# Patient Record
Sex: FEMALE | Race: WHITE | NOT HISPANIC OR LATINO | Employment: FULL TIME | URBAN - METROPOLITAN AREA
[De-identification: names, ages, dates, MRNs, and addresses within clinical notes are randomized per-mention and may not be internally consistent; named-entity substitution may affect disease eponyms.]

---

## 2023-08-30 ENCOUNTER — OFFICE VISIT (OUTPATIENT)
Dept: PRIMARY CARE | Facility: CLINIC | Age: 43
End: 2023-08-30
Payer: COMMERCIAL

## 2023-08-30 VITALS
HEART RATE: 81 BPM | WEIGHT: 194 LBS | BODY MASS INDEX: 29.4 KG/M2 | OXYGEN SATURATION: 98 % | DIASTOLIC BLOOD PRESSURE: 64 MMHG | HEIGHT: 68 IN | SYSTOLIC BLOOD PRESSURE: 126 MMHG | TEMPERATURE: 97.8 F

## 2023-08-30 DIAGNOSIS — R53.83 OTHER FATIGUE: ICD-10-CM

## 2023-08-30 DIAGNOSIS — G43.809 OTHER MIGRAINE WITHOUT STATUS MIGRAINOSUS, NOT INTRACTABLE: Primary | ICD-10-CM

## 2023-08-30 DIAGNOSIS — Z12.31 BREAST CANCER SCREENING BY MAMMOGRAM: ICD-10-CM

## 2023-08-30 DIAGNOSIS — E55.9 VITAMIN D DEFICIENCY: ICD-10-CM

## 2023-08-30 PROBLEM — G43.909 MIGRAINE WITHOUT STATUS MIGRAINOSUS, NOT INTRACTABLE: Status: ACTIVE | Noted: 2023-08-30

## 2023-08-30 PROBLEM — E53.8 VITAMIN B12 DEFICIENCY: Status: ACTIVE | Noted: 2021-07-12

## 2023-08-30 PROCEDURE — 99204 OFFICE O/P NEW MOD 45 MIN: CPT | Performed by: FAMILY MEDICINE

## 2023-08-30 RX ORDER — CYCLOBENZAPRINE HCL 10 MG
TABLET ORAL
COMMUNITY
Start: 2023-08-24 | End: 2024-03-12 | Stop reason: ALTCHOICE

## 2023-08-30 RX ORDER — DULOXETIN HYDROCHLORIDE 30 MG/1
30 CAPSULE, DELAYED RELEASE ORAL DAILY
COMMUNITY
Start: 2022-10-04 | End: 2023-12-14

## 2023-08-30 RX ORDER — FREMANEZUMAB-VFRM 225 MG/1.5ML
225 INJECTION SUBCUTANEOUS
Qty: 3 EACH | Refills: 3 | Status: SHIPPED | OUTPATIENT
Start: 2023-08-30 | End: 2023-09-29 | Stop reason: SDUPTHER

## 2023-08-30 RX ORDER — UBROGEPANT 50 MG/1
50 TABLET ORAL DAILY PRN
Qty: 12 TABLET | Refills: 3 | Status: SHIPPED | OUTPATIENT
Start: 2023-08-30 | End: 2024-03-12 | Stop reason: ALTCHOICE

## 2023-08-30 RX ORDER — BUPROPION HYDROCHLORIDE 150 MG/1
150 TABLET ORAL DAILY
COMMUNITY
Start: 2023-08-24 | End: 2023-09-29 | Stop reason: SDUPTHER

## 2023-08-30 RX ORDER — LANOLIN ALCOHOL/MO/W.PET/CERES
1-2 CREAM (GRAM) TOPICAL DAILY
COMMUNITY
Start: 2023-07-25 | End: 2023-12-14

## 2023-08-30 RX ORDER — PROPRANOLOL HYDROCHLORIDE 80 MG/1
80 CAPSULE, EXTENDED RELEASE ORAL DAILY
COMMUNITY
Start: 2023-08-24 | End: 2023-09-29 | Stop reason: SDUPTHER

## 2023-08-30 RX ORDER — OMEPRAZOLE 40 MG/1
40 CAPSULE, DELAYED RELEASE ORAL DAILY
COMMUNITY
Start: 2023-08-24 | End: 2023-09-29 | Stop reason: SDUPTHER

## 2023-08-30 SDOH — ECONOMIC STABILITY: FOOD INSECURITY: WITHIN THE PAST 12 MONTHS, YOU WORRIED THAT YOUR FOOD WOULD RUN OUT BEFORE YOU GOT MONEY TO BUY MORE.: NEVER TRUE

## 2023-08-30 SDOH — ECONOMIC STABILITY: TRANSPORTATION INSECURITY
IN THE PAST 12 MONTHS, HAS LACK OF TRANSPORTATION KEPT YOU FROM MEETINGS, WORK, OR FROM GETTING THINGS NEEDED FOR DAILY LIVING?: NO

## 2023-08-30 SDOH — ECONOMIC STABILITY: HOUSING INSECURITY
IN THE LAST 12 MONTHS, WAS THERE A TIME WHEN YOU DID NOT HAVE A STEADY PLACE TO SLEEP OR SLEPT IN A SHELTER (INCLUDING NOW)?: NO

## 2023-08-30 SDOH — ECONOMIC STABILITY: INCOME INSECURITY: IN THE LAST 12 MONTHS, WAS THERE A TIME WHEN YOU WERE NOT ABLE TO PAY THE MORTGAGE OR RENT ON TIME?: NO

## 2023-08-30 SDOH — HEALTH STABILITY: PHYSICAL HEALTH: ON AVERAGE, HOW MANY DAYS PER WEEK DO YOU ENGAGE IN MODERATE TO STRENUOUS EXERCISE (LIKE A BRISK WALK)?: 0 DAYS

## 2023-08-30 SDOH — ECONOMIC STABILITY: TRANSPORTATION INSECURITY
IN THE PAST 12 MONTHS, HAS THE LACK OF TRANSPORTATION KEPT YOU FROM MEDICAL APPOINTMENTS OR FROM GETTING MEDICATIONS?: NO

## 2023-08-30 SDOH — ECONOMIC STABILITY: FOOD INSECURITY: WITHIN THE PAST 12 MONTHS, THE FOOD YOU BOUGHT JUST DIDN'T LAST AND YOU DIDN'T HAVE MONEY TO GET MORE.: NEVER TRUE

## 2023-08-30 SDOH — HEALTH STABILITY: PHYSICAL HEALTH: ON AVERAGE, HOW MANY MINUTES DO YOU ENGAGE IN EXERCISE AT THIS LEVEL?: 0 MIN

## 2023-08-30 ASSESSMENT — SOCIAL DETERMINANTS OF HEALTH (SDOH)
HOW OFTEN DO YOU ATTEND CHURCH OR RELIGIOUS SERVICES?: NEVER
IN THE PAST 12 MONTHS, HAS THE ELECTRIC, GAS, OIL, OR WATER COMPANY THREATENED TO SHUT OFF SERVICE IN YOUR HOME?: NO
HOW HARD IS IT FOR YOU TO PAY FOR THE VERY BASICS LIKE FOOD, HOUSING, MEDICAL CARE, AND HEATING?: NOT HARD AT ALL
WITHIN THE LAST YEAR, HAVE YOU BEEN HUMILIATED OR EMOTIONALLY ABUSED IN OTHER WAYS BY YOUR PARTNER OR EX-PARTNER?: NO
WITHIN THE LAST YEAR, HAVE YOU BEEN KICKED, HIT, SLAPPED, OR OTHERWISE PHYSICALLY HURT BY YOUR PARTNER OR EX-PARTNER?: NO
IN A TYPICAL WEEK, HOW MANY TIMES DO YOU TALK ON THE PHONE WITH FAMILY, FRIENDS, OR NEIGHBORS?: TWICE A WEEK
HOW OFTEN DO YOU ATTENT MEETINGS OF THE CLUB OR ORGANIZATION YOU BELONG TO?: NEVER
DO YOU BELONG TO ANY CLUBS OR ORGANIZATIONS SUCH AS CHURCH GROUPS UNIONS, FRATERNAL OR ATHLETIC GROUPS, OR SCHOOL GROUPS?: NO
WITHIN THE LAST YEAR, HAVE TO BEEN RAPED OR FORCED TO HAVE ANY KIND OF SEXUAL ACTIVITY BY YOUR PARTNER OR EX-PARTNER?: NO
WITHIN THE LAST YEAR, HAVE YOU BEEN AFRAID OF YOUR PARTNER OR EX-PARTNER?: NO
HOW OFTEN DO YOU GET TOGETHER WITH FRIENDS OR RELATIVES?: TWICE A WEEK

## 2023-08-30 ASSESSMENT — LIFESTYLE VARIABLES
AUDIT-C TOTAL SCORE: 0
HOW OFTEN DO YOU HAVE A DRINK CONTAINING ALCOHOL: NEVER
HOW OFTEN DO YOU HAVE SIX OR MORE DRINKS ON ONE OCCASION: NEVER
HOW MANY STANDARD DRINKS CONTAINING ALCOHOL DO YOU HAVE ON A TYPICAL DAY: PATIENT DOES NOT DRINK
SKIP TO QUESTIONS 9-10: 1

## 2023-08-30 ASSESSMENT — ENCOUNTER SYMPTOMS
FATIGUE: 1
HEADACHES: 1
NUMBNESS: 0
WHEEZING: 0
COUGH: 0
FEVER: 0
MYALGIAS: 1

## 2023-08-30 ASSESSMENT — PATIENT HEALTH QUESTIONNAIRE - PHQ9
2. FEELING DOWN, DEPRESSED OR HOPELESS: NOT AT ALL
1. LITTLE INTEREST OR PLEASURE IN DOING THINGS: NOT AT ALL
SUM OF ALL RESPONSES TO PHQ9 QUESTIONS 1 & 2: 0

## 2023-08-30 NOTE — PROGRESS NOTES
Subjective   Patient ID: Louisa Henley is a 43 y.o. female who presents for Annual Exam and Headache.    Headache   The pain is located in the Temporal, occipital and frontal region. The pain is moderate. Pertinent negatives include no coughing, fever or numbness. The symptoms are aggravated by activity, weather changes and fatigue. She has tried acetaminophen, NSAIDs and Excedrin for the symptoms. The treatment provided no relief.      New patient.  Here today to establish care and discuss migraines  She previously worked at [employer] but now works for .  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  Currently taking propranolol 80 mg daily and also daily magnesium  She states that she always has a constant headache which is always present.  This typically involves both temples, occurs daily and is typically a 3-4 out of 10 intensity throbbing pain  She will get a worsening migraine headache approximately 6-7 times per month.  When this occurs she will get a sharp shooting pain in both temples and this can be severe.  Accompanied by photophobia and phonophobia.  She will occasionally get visual aura.  No nausea  She previously took Elavil which was not effective.  Topamax made her feel weird.  She was previously on Imitrex without improvement.  Migraines have been worse over the last several years.  She is never seen a neurologist.  She has not had any recent neuroimaging  She has had at least 3 occasions where she has needed a Toradol injection for her migraines      She has a history of chronic back pain due to scoliosis and takes Cymbalta for this which helps  She takes Wellbutrin for depression which has been helping  She currently takes daily omeprazole for GERD.  Reports having an EGD and colonoscopy last year  Over the last 6 to 8 months she has had muscle pain involving her entire  "body.  No paresthesias  She has had fatigue present for at least 1 year.  She feels tired and sleepy during the day but denies any snoring or any witnessed apneas or gasping  Has history of vitamin D deficiency.  Currently takes vitamin D OTC 1000 IU daily          Review of Systems   Constitutional:  Positive for fatigue. Negative for fever.   Respiratory:  Negative for cough and wheezing.    Cardiovascular:  Negative for chest pain.   Musculoskeletal:  Positive for myalgias.   Neurological:  Positive for headaches. Negative for numbness.       Objective   /64   Pulse 81   Temp 36.6 °C (97.8 °F) (Temporal)   Ht 1.715 m (5' 7.5\")   Wt 88 kg (194 lb)   LMP 08/02/2023 (Exact Date)   SpO2 98%   Breastfeeding No   BMI 29.94 kg/m²     Physical Exam  Vitals reviewed.   Constitutional:       General: She is not in acute distress.     Appearance: Normal appearance.   HENT:      Head: Normocephalic.      Right Ear: Tympanic membrane, ear canal and external ear normal.      Left Ear: Tympanic membrane, ear canal and external ear normal.      Mouth/Throat:      Mouth: Mucous membranes are moist.      Pharynx: No oropharyngeal exudate or posterior oropharyngeal erythema.   Eyes:      Extraocular Movements: Extraocular movements intact.      Conjunctiva/sclera: Conjunctivae normal.      Pupils: Pupils are equal, round, and reactive to light.   Cardiovascular:      Rate and Rhythm: Normal rate and regular rhythm.      Heart sounds: Normal heart sounds.   Pulmonary:      Effort: Pulmonary effort is normal.      Breath sounds: Normal breath sounds.   Lymphadenopathy:      Cervical: No cervical adenopathy.   Skin:     Findings: No rash.   Neurological:      General: No focal deficit present.      Mental Status: She is alert and oriented to person, place, and time.      Cranial Nerves: No cranial nerve deficit (Grossly normal).      Sensory: No sensory deficit.      Deep Tendon Reflexes: Reflexes normal.   Psychiatric:  "        Mood and Affect: Mood normal.         Behavior: Behavior normal.       Assessment/Plan   Problem List Items Addressed This Visit    None  Visit Diagnoses       Other migraine without status migrainosus, not intractable    -  Primary    Relevant Medications    fremanezumab (Ajovy Autoinjector) 225 mg/1.5 mL auto-injector    ubrogepant (Ubrelvy) 50 mg tablet    Other Relevant Orders    Referral to Neurology    Breast cancer screening by mammogram        Relevant Orders    BI mammo bilateral screening tomosynthesis    Other fatigue        Relevant Orders    CBC    Comprehensive Metabolic Panel    TSH with reflex to Free T4 if abnormal    Lipid Panel    Vitamin B12    Magnesium    Vitamin D deficiency        Relevant Orders    Vitamin D 25-Hydroxy,Total (for eval of Vitamin D levels)          Migraines: This has been poorly controlled with daily, constant headaches, as well as more severe migraine headaches which occur 6-7 times per month.  She previously failed treatment with medications including Imitrex, Topamax and Elavil, and these headaches have continued to recur despite treatment with propranolol and magnesium.  We we will continue propranolol and start Ajovy for migraine prophylaxis and Ubrelvy as needed for more severe migraine headaches.  Given referral to neurology and follow-up in 2 to 3 months for recheck  Due to her fatigue and muscle pain we will check labs including CBC, TSH and also vitamin D level.  Since she does take a PPI on a regular basis we will also check vitamin B12 levels and magnesium.  Discuss further at follow-up in 2 to 3 months

## 2023-08-30 NOTE — PROGRESS NOTES
Subjective   Patient ID: Louisa Henley is a 43 y.o. female who presents for Annual Exam and Headache.    Headache   The pain is located in the Temporal, occipital and frontal region. The pain is moderate. Pertinent negatives include no coughing, fever or numbness. The symptoms are aggravated by activity, weather changes and fatigue. She has tried acetaminophen, NSAIDs and Excedrin for the symptoms. The treatment provided no relief.      New patient.  Here today to establish care and discuss migraines  She previously worked at [employer] but now works for .  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  Currently taking propranolol 80 mg daily and also daily magnesium  She states that she always has a constant headache which is always present.  This typically involves both temples, occurs daily and is typically a 3-4 out of 10 intensity throbbing pain  She will get a worsening migraine headache approximately 6-7 times per month.  When this occurs she will get a sharp shooting pain in both temples and this can be severe.  Accompanied by photophobia and phonophobia.  She will occasionally get visual aura.  No nausea  She previously took Elavil which was not effective.  Topamax made her feel weird.  She was previously on Imitrex without improvement.  Migraines have been worse over the last several years.  She is never seen a neurologist.  She has not had any recent neuroimaging  She has had at least 3 occasions where she has needed a Toradol injection for her migraines      She has a history of chronic back pain due to scoliosis and takes Cymbalta for this which helps  She takes Wellbutrin for depression which has been helping  She currently takes daily omeprazole for GERD.  Reports having an EGD and colonoscopy last year  Over the last 6 to 8 months she has had muscle pain involving her entire  "body.  No paresthesias  She has had fatigue present for at least 1 year.  She feels tired and sleepy during the day but denies any snoring or any witnessed apneas or gasping  Has history of vitamin D deficiency.  Currently takes vitamin D OTC 1000 IU daily          Review of Systems   Constitutional:  Positive for fatigue. Negative for fever.   Respiratory:  Negative for cough and wheezing.    Cardiovascular:  Negative for chest pain.   Musculoskeletal:  Positive for myalgias.   Neurological:  Positive for headaches. Negative for numbness.       Objective   /64   Pulse 81   Temp 36.6 °C (97.8 °F) (Temporal)   Ht 1.715 m (5' 7.5\")   Wt 88 kg (194 lb)   LMP 08/02/2023 (Exact Date)   SpO2 98%   Breastfeeding No   BMI 29.94 kg/m²     Physical Exam  Vitals reviewed.   Constitutional:       General: She is not in acute distress.     Appearance: Normal appearance.   HENT:      Head: Normocephalic.      Right Ear: Tympanic membrane, ear canal and external ear normal.      Left Ear: Tympanic membrane, ear canal and external ear normal.      Mouth/Throat:      Mouth: Mucous membranes are moist.      Pharynx: No oropharyngeal exudate or posterior oropharyngeal erythema.   Eyes:      Extraocular Movements: Extraocular movements intact.      Conjunctiva/sclera: Conjunctivae normal.      Pupils: Pupils are equal, round, and reactive to light.   Cardiovascular:      Rate and Rhythm: Normal rate and regular rhythm.      Heart sounds: Normal heart sounds.   Pulmonary:      Effort: Pulmonary effort is normal.      Breath sounds: Normal breath sounds.   Lymphadenopathy:      Cervical: No cervical adenopathy.   Skin:     Findings: No rash.   Neurological:      General: No focal deficit present.      Mental Status: She is alert and oriented to person, place, and time.      Cranial Nerves: No cranial nerve deficit (Grossly normal).      Sensory: No sensory deficit.      Deep Tendon Reflexes: Reflexes normal.   Psychiatric:  "        Mood and Affect: Mood normal.         Behavior: Behavior normal.         Assessment/Plan   Problem List Items Addressed This Visit    None  Visit Diagnoses       Breast cancer screening by mammogram    -  Primary    Relevant Orders    BI mammo bilateral screening tomosynthesis    Other migraine without status migrainosus, not intractable        Relevant Medications    fremanezumab (Ajovy Autoinjector) 225 mg/1.5 mL auto-injector    ubrogepant (Ubrelvy) 50 mg tablet    Other Relevant Orders    Referral to Neurology    Other fatigue        Relevant Orders    CBC    Comprehensive Metabolic Panel    TSH with reflex to Free T4 if abnormal    Lipid Panel    Vitamin B12    Magnesium    Vitamin D deficiency        Relevant Orders    Vitamin D 25-Hydroxy,Total (for eval of Vitamin D levels)

## 2023-09-05 DIAGNOSIS — Z30.9 ENCOUNTER FOR CONTRACEPTIVE MANAGEMENT, UNSPECIFIED TYPE: ICD-10-CM

## 2023-09-05 RX ORDER — ETONOGESTREL AND ETHINYL ESTRADIOL VAGINAL RING .015; .12 MG/D; MG/D
1 RING VAGINAL
COMMUNITY
Start: 2022-10-18 | End: 2023-09-05 | Stop reason: SDUPTHER

## 2023-09-05 RX ORDER — ETONOGESTREL AND ETHINYL ESTRADIOL VAGINAL RING .015; .12 MG/D; MG/D
1 RING VAGINAL
Qty: 1 EACH | Refills: 12 | Status: SHIPPED | OUTPATIENT
Start: 2023-09-05 | End: 2023-09-06 | Stop reason: SDUPTHER

## 2023-09-06 RX ORDER — ETONOGESTREL AND ETHINYL ESTRADIOL VAGINAL RING .015; .12 MG/D; MG/D
1 RING VAGINAL
Qty: 1 EACH | Refills: 12 | Status: SHIPPED | OUTPATIENT
Start: 2023-09-06 | End: 2023-09-29 | Stop reason: SDUPTHER

## 2023-09-06 NOTE — TELEPHONE ENCOUNTER
Rx Refill Request Telephone Encounter    Name:  Louisa Henley  :  [id number]  Medication Name:  Nuvaring

## 2023-09-29 DIAGNOSIS — F32.A DEPRESSION, UNSPECIFIED DEPRESSION TYPE: ICD-10-CM

## 2023-09-29 DIAGNOSIS — K21.9 GASTROESOPHAGEAL REFLUX DISEASE, UNSPECIFIED WHETHER ESOPHAGITIS PRESENT: ICD-10-CM

## 2023-09-29 DIAGNOSIS — R03.0 ELEVATED BLOOD PRESSURE READING WITHOUT DIAGNOSIS OF HYPERTENSION: ICD-10-CM

## 2023-09-29 DIAGNOSIS — Z30.9 ENCOUNTER FOR CONTRACEPTIVE MANAGEMENT, UNSPECIFIED TYPE: ICD-10-CM

## 2023-09-29 DIAGNOSIS — G43.809 OTHER MIGRAINE WITHOUT STATUS MIGRAINOSUS, NOT INTRACTABLE: ICD-10-CM

## 2023-09-29 PROBLEM — R51.9 CHRONIC DAILY HEADACHE: Status: ACTIVE | Noted: 2023-01-31

## 2023-09-29 PROBLEM — N32.81 OAB (OVERACTIVE BLADDER): Status: ACTIVE | Noted: 2017-01-24

## 2023-09-29 RX ORDER — FREMANEZUMAB-VFRM 225 MG/1.5ML
225 INJECTION SUBCUTANEOUS
Qty: 3 EACH | Refills: 3 | Status: SHIPPED | OUTPATIENT
Start: 2023-09-29 | End: 2024-04-09

## 2023-09-29 RX ORDER — BUPROPION HYDROCHLORIDE 150 MG/1
150 TABLET ORAL DAILY
Qty: 90 TABLET | Refills: 3 | Status: SHIPPED | OUTPATIENT
Start: 2023-09-29 | End: 2024-03-12 | Stop reason: ALTCHOICE

## 2023-09-29 RX ORDER — OMEPRAZOLE 40 MG/1
40 CAPSULE, DELAYED RELEASE ORAL DAILY
Qty: 90 CAPSULE | Refills: 3 | Status: SHIPPED | OUTPATIENT
Start: 2023-09-29

## 2023-09-29 RX ORDER — PROPRANOLOL HYDROCHLORIDE 80 MG/1
80 CAPSULE, EXTENDED RELEASE ORAL DAILY
Qty: 90 CAPSULE | Refills: 3 | Status: SHIPPED | OUTPATIENT
Start: 2023-09-29 | End: 2023-12-14

## 2023-09-29 RX ORDER — ETONOGESTREL AND ETHINYL ESTRADIOL VAGINAL RING .015; .12 MG/D; MG/D
1 RING VAGINAL
Qty: 3 EACH | Refills: 3 | Status: SHIPPED | OUTPATIENT
Start: 2023-09-29 | End: 2024-04-09 | Stop reason: SDUPTHER

## 2023-09-29 NOTE — TELEPHONE ENCOUNTER
Rx Refill Request Telephone Encounter    Name:  Louisa Henley  :  [id number]  Medication Name:    etonogestreL-ethinyl estradioL (Nuvaring) 0.12-0.015 mg/24 hr vaginal ring   fremanezumab (Ajovy Autoinjector) 225 mg/1.5 mL auto-injector   propranolol LA (Inderal LA) 80 mg 24 hr capsule   omeprazole (PriLOSEC) 40 mg DR capsule   buPROPion XL (Wellbutrin XL) 150 mg 24 hr tablet     [facility] [address]  Date of last appointment:  na  Date of next appointment:  na  Best number to reach patient:  [phone]      **Most of these meds were sent to a pharmacy that closed and need to resent. Thank you

## 2023-10-06 ENCOUNTER — TELEPHONE (OUTPATIENT)
Dept: PRIMARY CARE | Facility: CLINIC | Age: 43
End: 2023-10-06

## 2023-10-06 ENCOUNTER — LAB (OUTPATIENT)
Dept: LAB | Facility: LAB | Age: 43
End: 2023-10-06
Payer: COMMERCIAL

## 2023-10-06 ENCOUNTER — TELEMEDICINE (OUTPATIENT)
Dept: PRIMARY CARE | Facility: CLINIC | Age: 43
End: 2023-10-06
Payer: COMMERCIAL

## 2023-10-06 DIAGNOSIS — N39.0 ACUTE UTI: ICD-10-CM

## 2023-10-06 DIAGNOSIS — R53.83 OTHER FATIGUE: ICD-10-CM

## 2023-10-06 DIAGNOSIS — E55.9 VITAMIN D DEFICIENCY: ICD-10-CM

## 2023-10-06 DIAGNOSIS — N39.0 ACUTE UTI: Primary | ICD-10-CM

## 2023-10-06 LAB
25(OH)D3 SERPL-MCNC: 14 NG/ML (ref 30–100)
ALBUMIN SERPL BCP-MCNC: 4.1 G/DL (ref 3.4–5)
ALP SERPL-CCNC: 64 U/L (ref 33–110)
ALT SERPL W P-5'-P-CCNC: 12 U/L (ref 7–45)
ANION GAP SERPL CALC-SCNC: 13 MMOL/L (ref 10–20)
AST SERPL W P-5'-P-CCNC: 14 U/L (ref 9–39)
BILIRUB SERPL-MCNC: 0.3 MG/DL (ref 0–1.2)
BUN SERPL-MCNC: 15 MG/DL (ref 6–23)
CALCIUM SERPL-MCNC: 9.1 MG/DL (ref 8.6–10.3)
CHLORIDE SERPL-SCNC: 107 MMOL/L (ref 98–107)
CHOLEST SERPL-MCNC: 224 MG/DL (ref 0–199)
CHOLESTEROL/HDL RATIO: 5.1
CO2 SERPL-SCNC: 21 MMOL/L (ref 21–32)
CREAT SERPL-MCNC: 1.04 MG/DL (ref 0.5–1.05)
ERYTHROCYTE [DISTWIDTH] IN BLOOD BY AUTOMATED COUNT: 14.6 % (ref 11.5–14.5)
GFR SERPL CREATININE-BSD FRML MDRD: 69 ML/MIN/1.73M*2
GLUCOSE SERPL-MCNC: 115 MG/DL (ref 74–99)
HCT VFR BLD AUTO: 38.7 % (ref 36–46)
HDLC SERPL-MCNC: 44.3 MG/DL
HGB BLD-MCNC: 12.6 G/DL (ref 12–16)
LDLC SERPL CALC-MCNC: 140 MG/DL (ref 140–190)
MAGNESIUM SERPL-MCNC: 2.05 MG/DL (ref 1.6–2.4)
MCH RBC QN AUTO: 28.6 PG (ref 26–34)
MCHC RBC AUTO-ENTMCNC: 32.6 G/DL (ref 32–36)
MCV RBC AUTO: 88 FL (ref 80–100)
NON HDL CHOLESTEROL: 180 MG/DL (ref 0–149)
NRBC BLD-RTO: 0 /100 WBCS (ref 0–0)
PLATELET # BLD AUTO: 421 X10*3/UL (ref 150–450)
PMV BLD AUTO: 9.8 FL (ref 7.5–11.5)
POTASSIUM SERPL-SCNC: 4.3 MMOL/L (ref 3.5–5.3)
PROT SERPL-MCNC: 7.1 G/DL (ref 6.4–8.2)
RBC # BLD AUTO: 4.4 X10*6/UL (ref 4–5.2)
SODIUM SERPL-SCNC: 137 MMOL/L (ref 136–145)
TRIGL SERPL-MCNC: 197 MG/DL (ref 0–149)
TSH SERPL-ACNC: 1.62 MIU/L (ref 0.44–3.98)
VIT B12 SERPL-MCNC: 268 PG/ML (ref 211–911)
VLDL: 39 MG/DL (ref 0–40)
WBC # BLD AUTO: 10.3 X10*3/UL (ref 4.4–11.3)

## 2023-10-06 PROCEDURE — 83735 ASSAY OF MAGNESIUM: CPT

## 2023-10-06 PROCEDURE — 80061 LIPID PANEL: CPT

## 2023-10-06 PROCEDURE — 36415 COLL VENOUS BLD VENIPUNCTURE: CPT

## 2023-10-06 PROCEDURE — 84443 ASSAY THYROID STIM HORMONE: CPT

## 2023-10-06 PROCEDURE — 87086 URINE CULTURE/COLONY COUNT: CPT

## 2023-10-06 PROCEDURE — 82607 VITAMIN B-12: CPT

## 2023-10-06 PROCEDURE — 80053 COMPREHEN METABOLIC PANEL: CPT

## 2023-10-06 PROCEDURE — 82306 VITAMIN D 25 HYDROXY: CPT

## 2023-10-06 PROCEDURE — 85027 COMPLETE CBC AUTOMATED: CPT

## 2023-10-06 PROCEDURE — 99213 OFFICE O/P EST LOW 20 MIN: CPT | Performed by: FAMILY MEDICINE

## 2023-10-06 RX ORDER — NITROFURANTOIN 25; 75 MG/1; MG/1
100 CAPSULE ORAL 2 TIMES DAILY
Qty: 10 CAPSULE | Refills: 0 | Status: SHIPPED | OUTPATIENT
Start: 2023-10-06 | End: 2023-10-11

## 2023-10-06 NOTE — TELEPHONE ENCOUNTER
Called pt; she is at work until 4 today. She stated that she is by herself in the clinic today and cannot leave, but she would be able to run down to the lab if a urine is needed. Please advise.

## 2023-10-06 NOTE — PROGRESS NOTES
Subjective   Patient ID: Louisa Henley is a 43 y.o. female who presents for No chief complaint on file..    UTI   This is a new problem. The current episode started yesterday.   Today's visit was done virtually.  Audio and video contact was made  She believes that she may have a UTI.  Currently on day 2 of symptoms.  Main symptoms been urinary urgency, hesitancy and frequency.  No dysuria.  She has had some intermittent abdominal cramping.  No fever, flank pain or nausea.  She reports that last UTI was approximately 6 to 7 months ago    Review of Systems    Objective   There were no vitals taken for this visit.    Physical Exam  Constitutional:       General: She is not in acute distress.     Appearance: Normal appearance. She is well-developed.   Pulmonary:      Effort: Pulmonary effort is normal.   Skin:     Findings: No rash.   Neurological:      Mental Status: She is alert.   Psychiatric:         Mood and Affect: Mood normal.         Behavior: Behavior normal.         Assessment/Plan   Problem List Items Addressed This Visit    None  Visit Diagnoses       Acute UTI    -  Primary    Relevant Medications    nitrofurantoin, macrocrystal-monohydrate, (Macrobid) 100 mg capsule    Other Relevant Orders    Urine Culture        She presents today with a 2-day history of UTI symptoms including frequency, urgency and hesitancy.  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  We will start treatment withNitrofurantoin twice daily for 5 days and plan on following up by phone on Monday once I have her urine culture results

## 2023-10-07 LAB — BACTERIA UR CULT: NO GROWTH

## 2023-10-09 ENCOUNTER — TELEPHONE (OUTPATIENT)
Dept: PRIMARY CARE | Facility: CLINIC | Age: 43
End: 2023-10-09
Payer: COMMERCIAL

## 2023-10-09 DIAGNOSIS — E55.9 VITAMIN D DEFICIENCY: Primary | ICD-10-CM

## 2023-10-09 RX ORDER — ERGOCALCIFEROL 1.25 MG/1
50000 CAPSULE ORAL
Qty: 4 CAPSULE | Refills: 1 | Status: SHIPPED | OUTPATIENT
Start: 2023-10-09 | End: 2023-12-14

## 2023-10-09 NOTE — TELEPHONE ENCOUNTER
I spoke with her over the phone.  Urine culture was negative.  She is still having urinary hesitancy and urgency but they have improved slightly.  No new symptoms, fever, nausea or back pain.  I recommended that she notify me in the next 3 to 5 days if symptoms do not resolve and we will evaluate further  Vitamin D level is low at 14.  We will treat with vitamin D2 50,000 IU weekly for 8 weeks and then recheck vitamin D level in 2 months to confirm it has returned to normal range  Glucose is 115.  She was not fasting at the time of lab draw.  We will check an A1c in 2 months also  LDL is 140.  We briefly discussed regular aerobic exercise and healthy diet and we will check this again in 1 year

## 2023-12-14 ENCOUNTER — OFFICE VISIT (OUTPATIENT)
Dept: PRIMARY CARE | Facility: CLINIC | Age: 43
End: 2023-12-14
Payer: COMMERCIAL

## 2023-12-14 VITALS
BODY MASS INDEX: 30.24 KG/M2 | HEART RATE: 100 BPM | OXYGEN SATURATION: 98 % | DIASTOLIC BLOOD PRESSURE: 74 MMHG | SYSTOLIC BLOOD PRESSURE: 140 MMHG | WEIGHT: 196 LBS

## 2023-12-14 DIAGNOSIS — G43.809 OTHER MIGRAINE WITHOUT STATUS MIGRAINOSUS, NOT INTRACTABLE: ICD-10-CM

## 2023-12-14 DIAGNOSIS — M41.9 SCOLIOSIS, UNSPECIFIED SCOLIOSIS TYPE, UNSPECIFIED SPINAL REGION: ICD-10-CM

## 2023-12-14 DIAGNOSIS — R53.83 OTHER FATIGUE: Primary | ICD-10-CM

## 2023-12-14 PROCEDURE — 99214 OFFICE O/P EST MOD 30 MIN: CPT | Performed by: FAMILY MEDICINE

## 2023-12-14 RX ORDER — DULOXETIN HYDROCHLORIDE 60 MG/1
60 CAPSULE, DELAYED RELEASE ORAL DAILY
Qty: 90 CAPSULE | Refills: 3 | Status: SHIPPED | OUTPATIENT
Start: 2023-12-14

## 2023-12-14 RX ORDER — RIZATRIPTAN BENZOATE 10 MG/1
10 TABLET ORAL ONCE AS NEEDED
Qty: 9 TABLET | Refills: 3 | Status: SHIPPED | OUTPATIENT
Start: 2023-12-14 | End: 2024-03-12 | Stop reason: ALTCHOICE

## 2023-12-14 RX ORDER — DULOXETIN HYDROCHLORIDE 60 MG/1
CAPSULE, DELAYED RELEASE ORAL
COMMUNITY
Start: 2023-12-08 | End: 2023-12-14 | Stop reason: SDUPTHER

## 2023-12-14 ASSESSMENT — ENCOUNTER SYMPTOMS
HEADACHES: 1
FATIGUE: 1
ABDOMINAL PAIN: 1
BACK PAIN: 1
FEVER: 0
COUGH: 0

## 2023-12-14 NOTE — PROGRESS NOTES
Subjective   Patient ID: Louisa Henley is a 43 y.o. female who presents for Fatigue and OTHER (Ubrevely was previously approved, Following month denied due to patient changing pharmacies. May need to resubmit. ).    Abdominal Pain  This is a new problem. The current episode started in the past 7 days. The pain is located in the LLQ. Associated symptoms include headaches. Pertinent negatives include no fever.      She is here today for follow-up on migraines  Migraines have been present for 10 to 15 years.  She is currently taking Ajovy IM monthly and also magnesium.  At her last visit in August we had started her on Ajovy for migraine prophylaxis and also Ubrelvy to take as needed for severe migraine headaches  She reports that these medications have been helping  Prior to starting this medication she was getting daily headaches, with approximately 15 more severe migraine headaches per month.  Since starting the Ajovy, this have been less frequent and she now will get 6-7 severe migraine headaches per month  She noticed that the Ubrelvy has been helping.  When she will take Ubrelvy, the migraines seem to last a shorter period of time, and will often resolve within 1 to 2 hours  Previously took Imitrex for acute treatment which was not effective  She previously took Elavil which was not effective, and could not tolerate Topamax (made her feel weird).  Had frequent migraines when taking propranolol  She has not been able to take Ubrelvy recently since it was not covered by insurance.  They are requiring a trial of 2 triptans in the past, and in addition that she try a triptan and NSAID together without improvement  She mentions that she has had increased fatigue over the past month.  She is currently taking care of of her grandfather who is living with her.  Sleep has been poor and she is often up several times per night to help take care of him.  She will typically only sleep 4 to 5 hours per night.  Work has been  going well for her and her job does not make her feel stressed.  She does not feel depressed    Last labs done 10/6/2023 showed decreased vitamin D at 14 and fasting glucose 115, but were otherwise unremarkable, including TSH and vitamin B12.  We did treat her with vitamin D 50,000 IU weekly for a total of 8 weeks  Would also like refill on Cymbalta.  She takes this for chronic low back pain due to scoliosis          Review of Systems   Constitutional:  Positive for fatigue. Negative for fever.   Respiratory:  Negative for cough.    Gastrointestinal:  Positive for abdominal pain (Had recent abdominal pain.  This has since resolved).   Musculoskeletal:  Positive for back pain.   Neurological:  Positive for headaches.       Objective   /74   Pulse 100   Wt 88.9 kg (196 lb)   SpO2 98%   BMI 30.24 kg/m²     Physical Exam  Vitals reviewed.   Constitutional:       General: She is not in acute distress.     Appearance: Normal appearance. She is well-developed.   HENT:      Head: Normocephalic.   Eyes:      Conjunctiva/sclera: Conjunctivae normal.   Cardiovascular:      Rate and Rhythm: Normal rate and regular rhythm.      Heart sounds: Normal heart sounds.   Pulmonary:      Effort: Pulmonary effort is normal.      Breath sounds: Normal breath sounds.   Skin:     Findings: No rash.   Neurological:      Mental Status: She is alert.   Psychiatric:         Mood and Affect: Mood normal.         Behavior: Behavior normal.         Assessment/Plan   Problem List Items Addressed This Visit          Medium    Migraine without status migrainosus, not intractable    Relevant Medications    rizatriptan (Maxalt) 10 mg tablet     Other Visit Diagnoses       Other fatigue    -  Primary    Relevant Orders    CBC and Auto Differential    Basic Metabolic Panel    Hemoglobin A1C    Vitamin D 25-Hydroxy,Total (for eval of Vitamin D levels)    Scoliosis, unspecified scoliosis type, unspecified spinal region        Relevant Medications     DULoxetine (Cymbalta) 60 mg DR capsule        #1 migraines: Migraine frequency has improved since starting Ajovy, we will continue current dose  She had noted improvement with Ubrelvy, however it was not covered by her insurance.  Insurance is requiring that she fail a trial of 2 different triptans, and also failed a trial of a triptan with an NSAID.  The only triptan that she has been on in the past was Imitrex, which was not effective.  We will start rizatriptan as needed, and I recommended that she take this along with an over-the-counter NSAID.  Plan on follow-up in 1 month for recheck.  If no improvement with rizatriptan plus NSAID, we will discuss getting her started on Ubrelvy.  I also did give her samples of Nurtec 75 mg, quantity of 2 to take as needed if any severe migraines.  2.  Fatigue: This has been present for approximately 1 month.  Recent TSH and B12 level were normal.  We will recheck CBC and BMP (last labs were done prior to fatigue starting) and also recheck her vitamin D level which was low the last time it was checked.  She has been taking care of her grandfather which has been affecting her sleep, and this may be contributing to her fatigue.  We will discuss further at follow-up in 1 month  I also did give her a refill on Cymbalta for her chronic back pain and scoliosis.  We did not discuss this in detail today

## 2024-03-12 ENCOUNTER — OFFICE VISIT (OUTPATIENT)
Dept: NEUROLOGY | Facility: CLINIC | Age: 44
End: 2024-03-12
Payer: COMMERCIAL

## 2024-03-12 VITALS
WEIGHT: 193.2 LBS | HEART RATE: 88 BPM | SYSTOLIC BLOOD PRESSURE: 144 MMHG | BODY MASS INDEX: 30.32 KG/M2 | DIASTOLIC BLOOD PRESSURE: 97 MMHG | HEIGHT: 67 IN

## 2024-03-12 DIAGNOSIS — E53.8 VITAMIN B12 DEFICIENCY: ICD-10-CM

## 2024-03-12 DIAGNOSIS — E55.9 VITAMIN D DEFICIENCY: ICD-10-CM

## 2024-03-12 DIAGNOSIS — R51.9 CHRONIC DAILY HEADACHE: ICD-10-CM

## 2024-03-12 DIAGNOSIS — G43.009 MIGRAINE WITHOUT AURA AND WITHOUT STATUS MIGRAINOSUS, NOT INTRACTABLE: Primary | ICD-10-CM

## 2024-03-12 PROCEDURE — 99205 OFFICE O/P NEW HI 60 MIN: CPT | Performed by: PSYCHIATRY & NEUROLOGY

## 2024-03-12 PROCEDURE — 1036F TOBACCO NON-USER: CPT | Performed by: PSYCHIATRY & NEUROLOGY

## 2024-03-12 RX ORDER — ESCITALOPRAM OXALATE 10 MG/1
10 TABLET ORAL DAILY
Qty: 30 TABLET | Refills: 2 | Status: SHIPPED | OUTPATIENT
Start: 2024-03-12 | End: 2024-06-10

## 2024-03-12 ASSESSMENT — ENCOUNTER SYMPTOMS
HEADACHES: 1
DIZZINESS: 1

## 2024-03-12 ASSESSMENT — PATIENT HEALTH QUESTIONNAIRE - PHQ9
SUM OF ALL RESPONSES TO PHQ9 QUESTIONS 1 & 2: 0
1. LITTLE INTEREST OR PLEASURE IN DOING THINGS: NOT AT ALL
2. FEELING DOWN, DEPRESSED OR HOPELESS: NOT AT ALL

## 2024-03-12 NOTE — PROGRESS NOTES
Louisa PHILLIPS Kale  44 y.o.       SUBJECTIVE    Headache   Associated symptoms include dizziness.      Louisa 44-year-old young lady who was seen today for evaluation of her chronic tension headache with recurrent migraine headache for many years.  Today her physical and neurological is normal except she was complained of dull headaches affecting both temples.  No history of any visual loss difficulty with speech or swallowing.  Today her physical and neurological exam is normal.  She has been tried on all over-the-counter medications and quite a few preventive medicines including Inderal Elavil and Ajovy as well as Topamax which gave her side effects.  She has tried prescription naproxen Imitrex Maxalt and Ubrelvy which gave her side effects.  At this point I would like to try her on Nurtec 75 mg every other day and start her on Lexapro 10 mg daily along with Cymbalta and stop her Wellbutrin.  I have discussed the headache diary food diary and the precautions to be taken and if she is still having any headaches then may consider Neurontin since she is having history of chronic back pain and chronic headache.  If she continues to have headache then may need to see a headache specialist for Botox.  I have discussed role of medication importance of sleep hygiene and exercise with her which she understood and see her back in 2 to 3 months    As you recall, Catarina is a 44-year-old young lady has been having headache for many years according to her she has daily headaches but for 5 times some months she has just pounding headache which she does complain of photophobia phonophobia has to apply ice and going to a dark room sometimes she does look pale but no history of any nausea or vomiting    She lives with her family and has daughter and a son who are both normal and healthy    There is no family history of migraine headache in either paternal part maternal side    No history of any smoking alcohol or substance  "abuse.      Due to technical limitations of voice recognition and human error, this note may not accurately reflect the care of the patient.    Review of Systems   Eyes:  Positive for visual disturbance.   Neurological:  Positive for dizziness and headaches.        Patient Active Problem List   Diagnosis    Migraine without status migrainosus, not intractable    Vitamin B12 deficiency    Vitamin D deficiency    Chronic daily headache    Elevated blood pressure reading without diagnosis of hypertension    OAB (overactive bladder)     No past medical history on file.  No past surgical history on file.    reports that she has never smoked. She has never used smokeless tobacco. Alcohol use questions deferred to the physician. She reports that she does not use drugs.    BP (!) 144/97 (BP Location: Right arm, Patient Position: Sitting, BP Cuff Size: Adult)   Pulse 88   Ht 1.702 m (5' 7\")   Wt 87.6 kg (193 lb 3.2 oz)   BMI 30.26 kg/m²     OBJECTIVE  Physical Exam/Neurological Exam   Constitutional: General appearance: no acute distress   Auscultation of Heart: Regular rate and rhythm, no murmurs, normal S1 and S2.   Carotid Arteries: Intact without any bruits.   Neck is supple.   No lymph adenopathy.   Peripheral Vascular Exam: Pulses +2 and equal in all extremities. No swelling, varicosities, edema or tenderness to palpations.    Abdomen is soft, nondistended. No organomegaly.  Mental status: The patient was in no distress, alert, interactive and cooperative. Affect is appropriate.   Orientation: oriented to person, oriented to place and oriented to time.   Memory: recent memory intact and remote memory intact.   Attention: normal attention span and normal concentrating ability.   Language: normal comprehension and no difficulty naming common objects.   Fund of knowledge: Patient displays adequate knowledge of current events, adequate fund of knowledge regarding past history and adequate fund of knowledge regarding " vocabulary.   Eyes: The ophthalmoscopic examination was normal. The fundi are visualized with normal disc margins and without.  Cranial nerve II: Visual fields full to confrontation.   Cranial nerves III, IV, and VI: Pupils round, equally reactive to light; no ptosis. EOMs intact. No nystagmus.   Cranial Nerve V: Facial sensation intact bilaterally.   Cranial nerve VII: Normal and symmetric facial strength.   Cranial nerve VIII: Hearing is intact bilaterally to finger rub / whisper.   Cranial nerves IX and X: Palate elevates symmetrically.   Cranial nerve XI: Shoulder shrug and neck rotation strength are intact.   Cranial nerve XII: Tongue midline with normal strength.   Motor: Motor exam was normal. Muscle bulk was normal in both upper and lower extremities. Muscle tone was normal in both upper and lower extremities. Muscle strength was 5/5 throughout. no abnormal or adventitious movements were present.   Deep Tendon Reflexes: left biceps 2+ , right biceps 2+, left triceps 2+, right triceps 2+, left brachioradialis 2+, right brachioradialis 2+, left patella 2+, right patella 2+, left ankle jerk 2+, right ankle jerk 2+   Plantar Reflex: Toes downgoing to plantar stimulation on the left. Toes downgoing to plantar stimulation on the right.   Sensory Exam: Normal to light touch.   Coordination: There is no limb dystaxia and rapid alternating movements are intact.  Gait: Gait is normal without spasticity, ataxia or bradykinesia. Stance is stable with a negative Romberg.      ASSESSMENT/PLAN  Diagnoses and all orders for this visit:  Migraine without aura and without status migrainosus, not intractable  -     escitalopram (Lexapro) 10 mg tablet; Take 1 tablet (10 mg) by mouth once daily.  -     rimegepant (Nurtec ODT) 75 mg tablet,disintegrating; Take 1 tablet (75 mg) by mouth every other day.  Chronic daily headache  Vitamin B12 deficiency  Vitamin D deficiency        Charlie Khan MD  3/12/2024  11:54 AM

## 2024-04-09 ENCOUNTER — OFFICE VISIT (OUTPATIENT)
Dept: PRIMARY CARE | Facility: CLINIC | Age: 44
End: 2024-04-09
Payer: COMMERCIAL

## 2024-04-09 VITALS
WEIGHT: 188 LBS | OXYGEN SATURATION: 97 % | HEART RATE: 85 BPM | BODY MASS INDEX: 29.44 KG/M2 | SYSTOLIC BLOOD PRESSURE: 144 MMHG | DIASTOLIC BLOOD PRESSURE: 76 MMHG

## 2024-04-09 DIAGNOSIS — Z30.9 ENCOUNTER FOR CONTRACEPTIVE MANAGEMENT, UNSPECIFIED TYPE: ICD-10-CM

## 2024-04-09 DIAGNOSIS — Z12.4 CERVICAL CANCER SCREENING: ICD-10-CM

## 2024-04-09 DIAGNOSIS — E66.3 OVERWEIGHT WITH BODY MASS INDEX (BMI) OF 29 TO 29.9 IN ADULT: Primary | ICD-10-CM

## 2024-04-09 PROCEDURE — 1036F TOBACCO NON-USER: CPT | Performed by: FAMILY MEDICINE

## 2024-04-09 PROCEDURE — 99213 OFFICE O/P EST LOW 20 MIN: CPT | Performed by: FAMILY MEDICINE

## 2024-04-09 PROCEDURE — 3008F BODY MASS INDEX DOCD: CPT | Performed by: FAMILY MEDICINE

## 2024-04-09 RX ORDER — SEMAGLUTIDE 0.25 MG/.5ML
0.25 INJECTION, SOLUTION SUBCUTANEOUS
Qty: 2 ML | Refills: 1 | Status: SHIPPED | OUTPATIENT
Start: 2024-04-14 | End: 2024-05-06

## 2024-04-09 RX ORDER — ETONOGESTREL AND ETHINYL ESTRADIOL VAGINAL RING .015; .12 MG/D; MG/D
1 RING VAGINAL
Qty: 3 EACH | Refills: 3 | Status: SHIPPED | OUTPATIENT
Start: 2024-04-09

## 2024-04-09 ASSESSMENT — ENCOUNTER SYMPTOMS
COUGH: 0
FEVER: 0
HEADACHES: 1

## 2024-04-09 NOTE — PROGRESS NOTES
Subjective   Patient ID: Louisa Henely is a 44 y.o. female who presents for Night Sweats (Noticed for about a year but worsening.), Obesity (Discuss weight loss options. ), Hot Flashes (Noticed for about a year but worsening.), and Ear Problem (Pt has been having ear soreness x 3 weeks/With post nasal drainage ).    HPI     She is here today to discuss starting a weight loss medication.  She is interested in possibly starting Wegovy  She has been working on lifestyle modification.  For exercise, she has been doing the bike at home, 3-4 times per week, for 20 to 30 minutes.  She has also been trying to walk at lunchtime  She has been doing better with her diet and has cut out pop.  She is trying to cook healthier and avoid excess carbohydrates  She has lost approximately 5 pounds with lifestyle modification  Past medical history is significant for impaired fasting glucose (fasting glucose 115 on labs October 2023) and also hyperlipidemia (lipid panel done October 2023 showed total cholesterol 224 and , triglycerides 197)  She mentions that she has had pressure in her right ear for the last 3 weeks.  She also mentions that for approximately 1 year, she has been getting night sweats, as well as hot flashes which occur 2-3 times per day.  Menses have been normal.  She is currently using NuvaRing.  Does not currently have a gynecologist  She is currently taking Cymbalta for chronic low back pain and has been taking this medication for several years.  She is following with neurology for migraines and was given Lexapro, but she has not yet started this medication      Review of Systems   Constitutional:  Negative for fever.   Respiratory:  Negative for cough.    Neurological:  Positive for headaches.       Objective   /76   Pulse 85   Wt 85.3 kg (188 lb)   SpO2 97%   BMI 29.44 kg/m²     Physical Exam  Vitals reviewed.   Constitutional:       General: She is not in acute distress.     Appearance: Normal  appearance. She is well-developed.   HENT:      Head: Normocephalic.      Right Ear: Ear canal and external ear normal.      Left Ear: Tympanic membrane, ear canal and external ear normal.      Ears:      Comments: Right tympanic membrane is slightly retracted.  There is no effusion or erythema     Nose: Nose normal.      Mouth/Throat:      Mouth: Mucous membranes are moist.   Eyes:      Conjunctiva/sclera: Conjunctivae normal.   Cardiovascular:      Rate and Rhythm: Normal rate and regular rhythm.      Heart sounds: Normal heart sounds.   Pulmonary:      Effort: Pulmonary effort is normal.      Breath sounds: Normal breath sounds.   Skin:     Findings: No rash.   Neurological:      Mental Status: She is alert.   Psychiatric:         Mood and Affect: Mood normal.         Behavior: Behavior normal.         Assessment/Plan   Problem List Items Addressed This Visit    None  Visit Diagnoses       Overweight with body mass index (BMI) of 29 to 29.9 in adult    -  Primary    Relevant Medications    semaglutide, weight loss, (Wegovy) 0.25 mg/0.5 mL pen injector (Start on 4/14/2024)    Other Relevant Orders    Referral to Nutrition Services    Encounter for contraceptive management, unspecified type        Relevant Medications    etonogestreL-ethinyl estradioL (Nuvaring) 0.12-0.015 mg/24 hr vaginal ring    Cervical cancer screening        Relevant Orders    Referral to Gynecology        Overweight: Her BMI today is 29.44, weight is 188 pounds.  We discussed continuing regular aerobic exercise and we will refer her to a nutritionist to further discuss healthy diet  We did discuss pharmacologic management to help with weight loss.  She does have weight-related comorbidities including hyperlipidemia and also impaired fasting glucose  After discussing options, we will start Wegovy.  We discussed efficacy and adverse effects.  Started 0.25 mg weekly.  Recommended she call us every month with an update on how she is tolerating  the medication, and as long as no significant adverse effects, we will titrate the dose up every week.  Follow-up in 3 months for recheck  We briefly discussed her night sweats and hot flashes.  She does currently take Cymbalta which can sometimes cause these symptoms.  We will refer her to get established with a gynecologist to discuss these symptoms, and if still present at follow-up we can consider trying to taper her off of Cymbalta.  I did briefly discuss starting her on gabapentin, but she has had adverse effects of this medication in the past.  Will discuss further at follow-up  Her blood pressure today is elevated at 144/76.  I recommended that she start monitoring out of office and we will recheck this at follow-up  Right ear pain is likely due to eustachian tube dysfunction.  No signs of infection on exam today.  Recommended using Flonase or daily antihistamine and follow-up in 2 to 3 weeks if not improving

## 2024-04-11 ENCOUNTER — TELEPHONE (OUTPATIENT)
Dept: PRIMARY CARE | Facility: CLINIC | Age: 44
End: 2024-04-11
Payer: COMMERCIAL

## 2024-04-11 DIAGNOSIS — E66.3 OVERWEIGHT WITH BODY MASS INDEX (BMI) OF 29 TO 29.9 IN ADULT: Primary | ICD-10-CM

## 2024-04-11 NOTE — TELEPHONE ENCOUNTER
I spoke with her and wegovy is not currently in stock.  We we will start her on Zepbound at 2.5 mg weekly.  She will contact us if not covered by insurance

## 2024-04-22 ENCOUNTER — LAB (OUTPATIENT)
Dept: LAB | Facility: LAB | Age: 44
End: 2024-04-22
Payer: COMMERCIAL

## 2024-04-22 DIAGNOSIS — R53.83 OTHER FATIGUE: ICD-10-CM

## 2024-04-22 DIAGNOSIS — E55.9 VITAMIN D DEFICIENCY: ICD-10-CM

## 2024-04-22 DIAGNOSIS — E55.9 VITAMIN D DEFICIENCY: Primary | ICD-10-CM

## 2024-04-22 LAB
25(OH)D3 SERPL-MCNC: 13 NG/ML (ref 30–100)
ANION GAP SERPL CALC-SCNC: 11 MMOL/L (ref 10–20)
BASOPHILS # BLD AUTO: 0.03 X10*3/UL (ref 0–0.1)
BASOPHILS NFR BLD AUTO: 0.4 %
BUN SERPL-MCNC: 12 MG/DL (ref 6–23)
CALCIUM SERPL-MCNC: 8.8 MG/DL (ref 8.6–10.3)
CHLORIDE SERPL-SCNC: 108 MMOL/L (ref 98–107)
CO2 SERPL-SCNC: 21 MMOL/L (ref 21–32)
COTININE UR QL SCN: NEGATIVE
CREAT SERPL-MCNC: 0.95 MG/DL (ref 0.5–1.05)
EGFRCR SERPLBLD CKD-EPI 2021: 76 ML/MIN/1.73M*2
EOSINOPHIL # BLD AUTO: 0.08 X10*3/UL (ref 0–0.7)
EOSINOPHIL NFR BLD AUTO: 1.2 %
ERYTHROCYTE [DISTWIDTH] IN BLOOD BY AUTOMATED COUNT: 14.3 % (ref 11.5–14.5)
EST. AVERAGE GLUCOSE BLD GHB EST-MCNC: 100 MG/DL
GLUCOSE SERPL-MCNC: 89 MG/DL (ref 74–99)
HBA1C MFR BLD: 5.1 %
HCT VFR BLD AUTO: 40 % (ref 36–46)
HGB BLD-MCNC: 13 G/DL (ref 12–16)
IMM GRANULOCYTES # BLD AUTO: 0.02 X10*3/UL (ref 0–0.7)
IMM GRANULOCYTES NFR BLD AUTO: 0.3 % (ref 0–0.9)
LYMPHOCYTES # BLD AUTO: 2.14 X10*3/UL (ref 1.2–4.8)
LYMPHOCYTES NFR BLD AUTO: 30.8 %
MCH RBC QN AUTO: 28.3 PG (ref 26–34)
MCHC RBC AUTO-ENTMCNC: 32.5 G/DL (ref 32–36)
MCV RBC AUTO: 87 FL (ref 80–100)
MONOCYTES # BLD AUTO: 0.51 X10*3/UL (ref 0.1–1)
MONOCYTES NFR BLD AUTO: 7.3 %
NEUTROPHILS # BLD AUTO: 4.17 X10*3/UL (ref 1.2–7.7)
NEUTROPHILS NFR BLD AUTO: 60 %
NRBC BLD-RTO: 0 /100 WBCS (ref 0–0)
PLATELET # BLD AUTO: 377 X10*3/UL (ref 150–450)
POTASSIUM SERPL-SCNC: 4.5 MMOL/L (ref 3.5–5.3)
RBC # BLD AUTO: 4.6 X10*6/UL (ref 4–5.2)
SODIUM SERPL-SCNC: 135 MMOL/L (ref 136–145)
WBC # BLD AUTO: 7 X10*3/UL (ref 4.4–11.3)

## 2024-04-22 PROCEDURE — 85025 COMPLETE CBC W/AUTO DIFF WBC: CPT

## 2024-04-22 PROCEDURE — 80048 BASIC METABOLIC PNL TOTAL CA: CPT

## 2024-04-22 PROCEDURE — 36415 COLL VENOUS BLD VENIPUNCTURE: CPT

## 2024-04-22 PROCEDURE — 83036 HEMOGLOBIN GLYCOSYLATED A1C: CPT

## 2024-04-22 PROCEDURE — 82306 VITAMIN D 25 HYDROXY: CPT

## 2024-04-22 RX ORDER — ERGOCALCIFEROL 1.25 MG/1
50000 CAPSULE ORAL
Qty: 4 CAPSULE | Refills: 1 | Status: SHIPPED | OUTPATIENT
Start: 2024-04-28 | End: 2024-06-27

## 2024-04-29 PROBLEM — E66.3 OVERWEIGHT WITH BODY MASS INDEX (BMI) OF 29 TO 29.9 IN ADULT: Status: ACTIVE | Noted: 2024-04-29

## 2024-07-16 ENCOUNTER — APPOINTMENT (OUTPATIENT)
Dept: PRIMARY CARE | Facility: CLINIC | Age: 44
End: 2024-07-16
Payer: COMMERCIAL

## 2024-07-17 ENCOUNTER — APPOINTMENT (OUTPATIENT)
Dept: NEUROLOGY | Facility: CLINIC | Age: 44
End: 2024-07-17
Payer: COMMERCIAL

## 2024-07-24 ENCOUNTER — TELEPHONE (OUTPATIENT)
Dept: NEUROLOGY | Facility: CLINIC | Age: 44
End: 2024-07-24
Payer: COMMERCIAL

## 2024-07-24 DIAGNOSIS — G43.009 MIGRAINE WITHOUT AURA AND WITHOUT STATUS MIGRAINOSUS, NOT INTRACTABLE: ICD-10-CM

## 2024-07-24 RX ORDER — ESCITALOPRAM OXALATE 10 MG/1
10 TABLET ORAL DAILY
Qty: 30 TABLET | Refills: 2 | Status: SHIPPED | OUTPATIENT
Start: 2024-07-24 | End: 2024-10-22

## 2024-07-30 ENCOUNTER — APPOINTMENT (OUTPATIENT)
Dept: NEUROLOGY | Facility: CLINIC | Age: 44
End: 2024-07-30
Payer: COMMERCIAL

## 2024-10-14 ENCOUNTER — TELEPHONE (OUTPATIENT)
Dept: NEUROLOGY | Facility: CLINIC | Age: 44
End: 2024-10-14
Payer: COMMERCIAL

## 2024-10-14 DIAGNOSIS — G43.009 MIGRAINE WITHOUT AURA AND WITHOUT STATUS MIGRAINOSUS, NOT INTRACTABLE: ICD-10-CM

## 2024-10-14 RX ORDER — ESCITALOPRAM OXALATE 10 MG/1
10 TABLET ORAL DAILY
Qty: 30 TABLET | Refills: 2 | Status: SHIPPED | OUTPATIENT
Start: 2024-10-14 | End: 2025-01-12

## 2024-11-18 ENCOUNTER — APPOINTMENT (OUTPATIENT)
Dept: NEUROLOGY | Facility: CLINIC | Age: 44
End: 2024-11-18
Payer: COMMERCIAL

## 2024-12-02 ENCOUNTER — APPOINTMENT (OUTPATIENT)
Dept: NEUROLOGY | Facility: CLINIC | Age: 44
End: 2024-12-02
Payer: COMMERCIAL

## 2025-01-09 ENCOUNTER — TELEPHONE (OUTPATIENT)
Dept: NEUROLOGY | Facility: CLINIC | Age: 45
End: 2025-01-09

## 2025-01-09 ENCOUNTER — APPOINTMENT (OUTPATIENT)
Dept: NEUROLOGY | Facility: CLINIC | Age: 45
End: 2025-01-09
Payer: COMMERCIAL

## 2025-01-09 NOTE — TELEPHONE ENCOUNTER
----- Message from Charlie Khan sent at 1/9/2025  1:42 PM EST -----  Regarding: RE: Possible Dismissal  Yes. Dismisss.  ----- Message -----  From: Jonna Barlow  Sent: 1/9/2025   9:45 AM EST  To: Charlie Khan MD  Subject: Possible Dismissal                               Had newpt appt on 3/12/24 and has not been seen in office since.  7/17/24 SAME DAY CX  7/30/24 NS  11/18/24 NS  12/2/24 NS  1/9/25 SAME DAY CX    Would you like to dismiss?

## 2025-01-17 DIAGNOSIS — M41.9 SCOLIOSIS, UNSPECIFIED SCOLIOSIS TYPE, UNSPECIFIED SPINAL REGION: ICD-10-CM

## 2025-01-20 RX ORDER — DULOXETIN HYDROCHLORIDE 60 MG/1
60 CAPSULE, DELAYED RELEASE ORAL DAILY
Qty: 90 CAPSULE | Refills: 1 | Status: SHIPPED | OUTPATIENT
Start: 2025-01-20

## 2025-01-24 ENCOUNTER — TELEPHONE (OUTPATIENT)
Dept: PRIMARY CARE | Facility: CLINIC | Age: 45
End: 2025-01-24
Payer: COMMERCIAL

## 2025-01-24 NOTE — LETTER
January 24, 2025     Patient: Louisa Henley   YOB: 1980   Date of Visit: 1/24/2025       To Whom It May Concern:    Louisa Henley was seen in my clinic on 1/24/2025. Please excuse Louisa for her absence from work on this day to make the appointment.    If you have any questions or concerns, please don't hesitate to call.         Sincerely,         Alexandro Freeman, DO

## 2025-02-14 DIAGNOSIS — Z12.31 BREAST CANCER SCREENING BY MAMMOGRAM: Primary | ICD-10-CM

## 2025-02-14 DIAGNOSIS — Z30.9 ENCOUNTER FOR CONTRACEPTIVE MANAGEMENT, UNSPECIFIED TYPE: ICD-10-CM

## 2025-02-14 RX ORDER — ETONOGESTREL AND ETHINYL ESTRADIOL VAGINAL RING .015; .12 MG/D; MG/D
1 RING VAGINAL
Qty: 3 EACH | Refills: 3 | Status: SHIPPED | OUTPATIENT
Start: 2025-02-14

## 2025-02-24 DIAGNOSIS — Z30.9 ENCOUNTER FOR CONTRACEPTIVE MANAGEMENT, UNSPECIFIED TYPE: Primary | ICD-10-CM

## 2025-02-24 RX ORDER — NORETHINDRONE ACETATE AND ETHINYL ESTRADIOL .02; 1 MG/1; MG/1
1 TABLET ORAL DAILY
Qty: 21 TABLET | Refills: 11 | Status: SHIPPED | OUTPATIENT
Start: 2025-02-24

## 2025-02-28 DIAGNOSIS — G43.009 MIGRAINE WITHOUT AURA AND WITHOUT STATUS MIGRAINOSUS, NOT INTRACTABLE: ICD-10-CM

## 2025-02-28 RX ORDER — ESCITALOPRAM OXALATE 10 MG/1
10 TABLET ORAL DAILY
Qty: 30 TABLET | Refills: 2 | Status: SHIPPED | OUTPATIENT
Start: 2025-02-28 | End: 2025-05-29

## 2025-03-05 ENCOUNTER — HOSPITAL ENCOUNTER (OUTPATIENT)
Dept: RADIOLOGY | Facility: HOSPITAL | Age: 45
Discharge: HOME | End: 2025-03-05
Payer: COMMERCIAL

## 2025-03-05 DIAGNOSIS — Z12.31 BREAST CANCER SCREENING BY MAMMOGRAM: ICD-10-CM

## 2025-03-05 PROCEDURE — 77067 SCR MAMMO BI INCL CAD: CPT | Performed by: RADIOLOGY

## 2025-03-05 PROCEDURE — 77063 BREAST TOMOSYNTHESIS BI: CPT | Performed by: RADIOLOGY

## 2025-03-05 PROCEDURE — 77067 SCR MAMMO BI INCL CAD: CPT

## 2025-03-07 ENCOUNTER — HOSPITAL ENCOUNTER (OUTPATIENT)
Dept: RADIOLOGY | Facility: EXTERNAL LOCATION | Age: 45
Discharge: HOME | End: 2025-03-07

## 2025-03-12 ENCOUNTER — APPOINTMENT (OUTPATIENT)
Dept: PRIMARY CARE | Facility: CLINIC | Age: 45
End: 2025-03-12
Payer: COMMERCIAL

## 2025-03-12 VITALS
DIASTOLIC BLOOD PRESSURE: 82 MMHG | BODY MASS INDEX: 31.71 KG/M2 | HEART RATE: 90 BPM | TEMPERATURE: 97.8 F | OXYGEN SATURATION: 99 % | SYSTOLIC BLOOD PRESSURE: 130 MMHG | WEIGHT: 202 LBS | HEIGHT: 67 IN

## 2025-03-12 DIAGNOSIS — M41.9 SCOLIOSIS, UNSPECIFIED SCOLIOSIS TYPE, UNSPECIFIED SPINAL REGION: ICD-10-CM

## 2025-03-12 DIAGNOSIS — Z00.00 ROUTINE HEALTH MAINTENANCE: Primary | ICD-10-CM

## 2025-03-12 DIAGNOSIS — E53.8 VITAMIN B12 DEFICIENCY: ICD-10-CM

## 2025-03-12 DIAGNOSIS — Z12.4 CERVICAL CANCER SCREENING: ICD-10-CM

## 2025-03-12 DIAGNOSIS — E66.811 CLASS 1 OBESITY IN ADULT, UNSPECIFIED BMI, UNSPECIFIED OBESITY TYPE, UNSPECIFIED WHETHER SERIOUS COMORBIDITY PRESENT: ICD-10-CM

## 2025-03-12 DIAGNOSIS — G43.809 OTHER MIGRAINE WITHOUT STATUS MIGRAINOSUS, NOT INTRACTABLE: ICD-10-CM

## 2025-03-12 DIAGNOSIS — S16.1XXA STRAIN OF NECK MUSCLE, INITIAL ENCOUNTER: ICD-10-CM

## 2025-03-12 DIAGNOSIS — E55.9 VITAMIN D DEFICIENCY: ICD-10-CM

## 2025-03-12 PROCEDURE — 90471 IMMUNIZATION ADMIN: CPT | Performed by: FAMILY MEDICINE

## 2025-03-12 PROCEDURE — 90715 TDAP VACCINE 7 YRS/> IM: CPT | Performed by: FAMILY MEDICINE

## 2025-03-12 PROCEDURE — 1036F TOBACCO NON-USER: CPT | Performed by: FAMILY MEDICINE

## 2025-03-12 PROCEDURE — 99396 PREV VISIT EST AGE 40-64: CPT | Performed by: FAMILY MEDICINE

## 2025-03-12 PROCEDURE — 3008F BODY MASS INDEX DOCD: CPT | Performed by: FAMILY MEDICINE

## 2025-03-12 RX ORDER — DULOXETIN HYDROCHLORIDE 30 MG/1
30 CAPSULE, DELAYED RELEASE ORAL DAILY
Qty: 30 CAPSULE | Refills: 0 | Status: SHIPPED | OUTPATIENT
Start: 2025-03-12 | End: 2025-04-11

## 2025-03-12 RX ORDER — MELOXICAM 15 MG/1
15 TABLET ORAL DAILY PRN
Qty: 30 TABLET | Refills: 0 | Status: SHIPPED | OUTPATIENT
Start: 2025-03-12

## 2025-03-12 RX ORDER — UBROGEPANT 50 MG/1
50 TABLET ORAL DAILY PRN
Qty: 12 TABLET | Refills: 3 | Status: SHIPPED | OUTPATIENT
Start: 2025-03-12

## 2025-03-12 SDOH — HEALTH STABILITY: PHYSICAL HEALTH: ON AVERAGE, HOW MANY MINUTES DO YOU ENGAGE IN EXERCISE AT THIS LEVEL?: 0 MIN

## 2025-03-12 SDOH — HEALTH STABILITY: PHYSICAL HEALTH: ON AVERAGE, HOW MANY DAYS PER WEEK DO YOU ENGAGE IN MODERATE TO STRENUOUS EXERCISE (LIKE A BRISK WALK)?: 0 DAYS

## 2025-03-12 ASSESSMENT — ENCOUNTER SYMPTOMS
BACK PAIN: 1
FEVER: 0
HEADACHES: 1
COUGH: 0
ARTHRALGIAS: 1

## 2025-03-12 NOTE — ASSESSMENT & PLAN NOTE
Orders:    CBC; Future    Comprehensive Metabolic Panel; Future    Lipid Panel; Future    TSH with reflex to Free T4 if abnormal; Future    Vitamin D 25-Hydroxy,Total (for eval of Vitamin D levels); Future    Referral to Nutrition Services; Future

## 2025-03-12 NOTE — PROGRESS NOTES
Subjective   Patient ID: Louisa Henley is a 45 y.o. female who presents for Annual Exam and Shoulder Pain (Left shoulder pain x 1 week ).    Shoulder Pain   The pain is present in the neck and left shoulder. The quality of the pain is described as aching, burning and dull (pins and needles). The pain is at a severity of 7/10. Pertinent negatives include no fever.          She is here today for annual physical  She also mentions that she has had left shoulder and neck pain for approximately 1 week.  No injury prior to onset.  Pain is a 7 out of 10 intensity pain.  No headache.  The pain feels like a pins-and-needles.  No paresthesias in her hand.  No history of similar symptoms in the past  She has a history of migraines and was previously following with neurology but no longer follows with them.  She currently takes Lexapro 10 mg daily for migraine prophylaxis which has been helping.  She was getting 15 migraines per month prior to onset.  Now she will get 3-4 migraines per month  She was previously treated with both sumatriptan and rizatriptan which did not help.  We had given her Ubrelvy in the past, but it was not covered by insurance since she needed to fail 2 different triptans  She takes Cymbalta 60 mg daily for chronic low back pain.  She still has low back pain and is not sure if it has been helping  Takes omeprazole rarely for GERD  She recently started Loestrin approximately 10 days ago  Family history is significant for uterine and ovarian cancer in her mother.  They did not feel that this was hereditary.  There is also a family history of lung cancer in her mother, and colon cancer in her grandfather on her mother side  She has a history of vitamin D and vitamin B12 deficiency.  Currently not taking any B12 or D supplements  She does not exercise regularly.  Tries to get salads and veggies in her diet, but does drink 2 to 3 cans of pop per day  Last mammogram was done 3/2025.  This was category 1  Last  "colonoscopy was done 2022.  Repeat 5 years recommended  Last Tdap vaccine was 2011  Last Pap test was greater than 5 years ago          Patient Health Questionnaire-2 Score: 0 (3/12/2025  3:22 PM)      Review of Systems   Constitutional:  Negative for fever.   Respiratory:  Negative for cough.    Cardiovascular:  Negative for chest pain.   Musculoskeletal:  Positive for arthralgias and back pain.   Neurological:  Positive for headaches.       Objective   /82   Pulse 90   Temp 36.6 °C (97.8 °F) (Temporal)   Ht 1.702 m (5' 7\")   Wt 91.6 kg (202 lb)   SpO2 99%   BMI 31.64 kg/m²     Physical Exam  Vitals reviewed.   Constitutional:       General: She is not in acute distress.     Appearance: Normal appearance. She is well-developed.   HENT:      Head: Normocephalic.      Right Ear: Tympanic membrane, ear canal and external ear normal.      Left Ear: Tympanic membrane, ear canal and external ear normal.      Nose: Nose normal.      Mouth/Throat:      Mouth: Mucous membranes are moist.   Eyes:      Conjunctiva/sclera: Conjunctivae normal.   Neck:      Thyroid: No thyromegaly.      Vascular: No JVD.   Cardiovascular:      Rate and Rhythm: Normal rate and regular rhythm.      Heart sounds: Normal heart sounds.   Pulmonary:      Effort: Pulmonary effort is normal.      Breath sounds: Normal breath sounds.   Musculoskeletal:         General: Tenderness present.      Comments: Tenderness to palpation involving the left lower cervical paraspinals and superior aspect of the trapezius.  No shoulder tenderness.  She has full left and right cervical rotation.  Full active left shoulder abduction   Lymphadenopathy:      Cervical: No cervical adenopathy.   Skin:     Findings: No rash.   Neurological:      Mental Status: She is alert and oriented to person, place, and time.   Psychiatric:         Mood and Affect: Mood normal.         Behavior: Behavior normal.         Assessment/Plan   Assessment & Plan  Routine health " maintenance         Cervical cancer screening         Other migraine without status migrainosus, not intractable    Orders:    ubrogepant (Ubrelvy) 50 mg tablet; Take 1 tablet (50 mg) by mouth once daily as needed (migraines).    Scoliosis, unspecified scoliosis type, unspecified spinal region    Orders:    DULoxetine (Cymbalta) 30 mg DR capsule; Take 1 capsule (30 mg) by mouth once daily. Do not crush or chew.    Vitamin D deficiency    Orders:    CBC; Future    Comprehensive Metabolic Panel; Future    Lipid Panel; Future    TSH with reflex to Free T4 if abnormal; Future    Vitamin D 25-Hydroxy,Total (for eval of Vitamin D levels); Future    Referral to Nutrition Services; Future    Vitamin B12 deficiency    Orders:    Vitamin B12; Future    Strain of neck muscle, initial encounter    Orders:    meloxicam (Mobic) 15 mg tablet; Take 1 tablet (15 mg) by mouth once daily as needed (pain).    Class 1 obesity in adult, unspecified BMI, unspecified obesity type, unspecified whether serious comorbidity present    Orders:    CBC; Future    Comprehensive Metabolic Panel; Future    Lipid Panel; Future    TSH with reflex to Free T4 if abnormal; Future    Vitamin D 25-Hydroxy,Total (for eval of Vitamin D levels); Future    Referral to Nutrition Services; Future    Discussed healthy diet and regular exercise.  She is interested in seeing a nutritionist and was given a referral  Check screening labs including glucose and lipid panel.  Will also check vitamin D and vitamin B12 levels due to history of B12 and D deficiency in the past  She is up-to-date on colon cancer and breast cancer screening  She is due for a Pap, however she is currently spotting.  We will have her follow-up in the next few months for a Pap test  Given tetanus vaccine today  Migraines: Migraines have been less frequent since starting Lexapro, however she has still been getting 3-4 migraine headaches per week.  Sumatriptan and rizatriptan were both not  effective for her in the past.  We will start Ubrelvy 50 mg as needed.  I did give her a sample of Ubrelvy 100 mg to try  Since she is now taking Lexapro, I would like to try to taper her off of Cymbalta.  We will decrease dose to 30 mg daily for 1 month and then stop.  If any worsening pain after stopping Cymbalta she will let me know and we will restart  Left shoulder neck pain is most likely muscular.  Will start meloxicam as needed.  She has Flexeril at home and can take as needed.  If not improving in the next few weeks can contact me and we will consider x-ray and physical therapy referral  Follow-up in the next few months for migraine recheck.  Plan on Pap testing at that time

## 2025-03-12 NOTE — ASSESSMENT & PLAN NOTE
Orders:    ubrogepant (Ubrelvy) 50 mg tablet; Take 1 tablet (50 mg) by mouth once daily as needed (migraines).

## 2025-04-10 ENCOUNTER — TELEPHONE (OUTPATIENT)
Dept: PRIMARY CARE | Facility: CLINIC | Age: 45
End: 2025-04-10
Payer: COMMERCIAL

## 2025-04-10 DIAGNOSIS — M41.9 SCOLIOSIS, UNSPECIFIED SCOLIOSIS TYPE, UNSPECIFIED SPINAL REGION: ICD-10-CM

## 2025-04-10 DIAGNOSIS — B00.1 HERPES LABIALIS: Primary | ICD-10-CM

## 2025-04-10 RX ORDER — DULOXETIN HYDROCHLORIDE 60 MG/1
60 CAPSULE, DELAYED RELEASE ORAL DAILY
Qty: 30 CAPSULE | Refills: 5 | Status: SHIPPED | OUTPATIENT
Start: 2025-04-10

## 2025-04-10 RX ORDER — VALACYCLOVIR HYDROCHLORIDE 1 G/1
2 TABLET, FILM COATED ORAL 2 TIMES DAILY PRN
Qty: 4 TABLET | Refills: 3 | Status: SHIPPED | OUTPATIENT
Start: 2025-04-10 | End: 2025-04-11

## 2025-04-10 NOTE — TELEPHONE ENCOUNTER
I spoke with her and she is requesting to increase Cymbalta back to her previous dose of 60 mg daily.  I will send this to the pharmacy  She also has developed a cold sore.  I sent in a prescription for Valtrex for her to take to treat this, along with refills for any future flareups

## 2025-04-15 ENCOUNTER — APPOINTMENT (OUTPATIENT)
Dept: PRIMARY CARE | Facility: CLINIC | Age: 45
End: 2025-04-15
Payer: COMMERCIAL

## 2025-04-15 PROBLEM — E66.811 CLASS 1 OBESITY IN ADULT: Status: ACTIVE | Noted: 2025-04-15

## 2025-04-18 ENCOUNTER — TELEMEDICINE (OUTPATIENT)
Dept: PRIMARY CARE | Facility: CLINIC | Age: 45
End: 2025-04-18
Payer: COMMERCIAL

## 2025-04-18 DIAGNOSIS — F41.9 ANXIETY: Primary | ICD-10-CM

## 2025-04-18 DIAGNOSIS — Z30.9 ENCOUNTER FOR CONTRACEPTIVE MANAGEMENT, UNSPECIFIED TYPE: ICD-10-CM

## 2025-04-18 PROCEDURE — 1036F TOBACCO NON-USER: CPT | Performed by: FAMILY MEDICINE

## 2025-04-18 PROCEDURE — 99214 OFFICE O/P EST MOD 30 MIN: CPT | Performed by: FAMILY MEDICINE

## 2025-04-18 RX ORDER — HYDROXYZINE HYDROCHLORIDE 10 MG/1
10 TABLET, FILM COATED ORAL EVERY 8 HOURS PRN
Qty: 30 TABLET | Refills: 1 | Status: SHIPPED | OUTPATIENT
Start: 2025-04-18

## 2025-04-18 RX ORDER — NORETHINDRONE ACETATE AND ETHINYL ESTRADIOL 1.5-30(21)
1 KIT ORAL DAILY
Qty: 28 TABLET | Refills: 12 | Status: SHIPPED | OUTPATIENT
Start: 2025-04-18 | End: 2026-04-18

## 2025-04-18 NOTE — PROGRESS NOTES
Subjective   Patient ID: Louisa Henley is a 45 y.o. female who presents for No chief complaint on file..    Anxiety  Presents for initial visit.            Virtual or Telephone Consent    An interactive audio and video telecommunication system which permits real time communications between the patient (at the originating site) and provider (at the distant site) was utilized to provide this telehealth service.   Verbal consent was requested and obtained from Louisa Henley on this date, 04/18/25 for a telehealth visit and the patient's location was confirmed at the time of the visit.    She is here today to discuss adjusting contraceptive meds and also to discuss anxiety    She is currently taking Loestrin 1/20 daily.  She started taking this almost 2 months ago  She has been getting frequent spotting throughout the entire month since starting this med.  No cramping.  She was previously on the NuvaRing, but would often forget to use it  Typically she will have a menstrual period once per month.  The last 4 to 5 days.  Her last menstrual period was 1 month ago.  Main reason is for pregnancy prevention  She has had worsening anxiety over the past 3 to 4 weeks.  She admits to a lot of stress at work which is contributing to her symptoms.  Symptoms occur daily.  She will get a lot of mind racing.  Sometimes she will get anxiety symptoms at night and have trouble sleeping.  She has been more easily irritated.  No significant panic attacks  She is currently taking Cymbalta 60 mg daily for chronic low back pain, as well as Lexapro 10 mg daily for migraines.  Lexapro had been started by her neurologist and has been helping with her migraines        Review of Systems   Genitourinary:  Positive for menstrual problem.       Objective   There were no vitals taken for this visit.    Physical Exam  Constitutional:       General: She is not in acute distress.     Appearance: Normal appearance. She is well-developed.   Pulmonary:       Effort: Pulmonary effort is normal.   Skin:     Findings: No rash.   Neurological:      Mental Status: She is alert.   Psychiatric:         Mood and Affect: Mood normal.         Behavior: Behavior normal.         Assessment/Plan   Assessment & Plan  Anxiety    Orders:    Follow Up [facility]; Future    hydrOXYzine HCL (Atarax) 10 mg tablet; Take 1 tablet (10 mg) by mouth every 8 hours if needed for anxiety.    Encounter for contraceptive management, unspecified type    Orders:    norethindrone-e.estradioL-iron (Microgestin FE 1.5/30) 1.5 mg-30 mcg (21)/75 mg (7) tablet; Take 1 tablet by mouth once daily.    Contraception: She has been getting frequent spotting since starting Loestrin approximately 2 months ago.  We will stop Loestrin, and start an OCP with a higher estrogen level (Microgestin 1.5/30 mcg).  She has a follow-up scheduled with me in 1 month and we will discuss further at that time  Anxiety: She reports worsening anxiety symptoms over the past 3 to 4 weeks.  She is already taking both Lexapro and Cymbalta, so I do not want to increase either of these medications, or add any other medications with serotonergic properties in order to avoid serotonin syndrome.  We will start hydroxyzine 10 mg as needed for anxiety.  Adverse effects including drowsiness and sedation discussed.  I also gave her a referral for counseling.  Recheck symptoms at follow-up in 1 month

## 2025-04-21 ENCOUNTER — TELEPHONE (OUTPATIENT)
Dept: PRIMARY CARE | Facility: CLINIC | Age: 45
End: 2025-04-21
Payer: COMMERCIAL

## 2025-04-21 NOTE — PROGRESS NOTES
This writer spoke with patient regarding collaborative care referral. Patient is scheduled for an assessment on 5/7/25 at 8:30 AM in [facility].

## 2025-05-07 ENCOUNTER — SOCIAL WORK (OUTPATIENT)
Dept: PRIMARY CARE | Facility: CLINIC | Age: 45
End: 2025-05-07

## 2025-05-07 ENCOUNTER — TELEPHONE (OUTPATIENT)
Dept: PRIMARY CARE | Facility: CLINIC | Age: 45
End: 2025-05-07

## 2025-05-07 ENCOUNTER — APPOINTMENT (OUTPATIENT)
Dept: PRIMARY CARE | Facility: CLINIC | Age: 45
End: 2025-05-07
Payer: COMMERCIAL

## 2025-05-07 ASSESSMENT — ANXIETY QUESTIONNAIRES
4. TROUBLE RELAXING: SEVERAL DAYS
7. FEELING AFRAID AS IF SOMETHING AWFUL MIGHT HAPPEN: NOT AT ALL
2. NOT BEING ABLE TO STOP OR CONTROL WORRYING: NEARLY EVERY DAY
6. BECOMING EASILY ANNOYED OR IRRITABLE: SEVERAL DAYS
3. WORRYING TOO MUCH ABOUT DIFFERENT THINGS: NEARLY EVERY DAY
GAD7 TOTAL SCORE: 11
5. BEING SO RESTLESS THAT IT IS HARD TO SIT STILL: NOT AT ALL
IF YOU CHECKED OFF ANY PROBLEMS ON THIS QUESTIONNAIRE, HOW DIFFICULT HAVE THESE PROBLEMS MADE IT FOR YOU TO DO YOUR WORK, TAKE CARE OF THINGS AT HOME, OR GET ALONG WITH OTHER PEOPLE: SOMEWHAT DIFFICULT
1. FEELING NERVOUS, ANXIOUS, OR ON EDGE: NEARLY EVERY DAY

## 2025-05-07 NOTE — TELEPHONE ENCOUNTER
I spoke with her.  She has been getting hot flashes and is asking about any over-the-counter treatments she could use for this.  I recommended a trial of soy and will discuss further at her next appointment

## 2025-05-07 NOTE — PROGRESS NOTES
Collaborative Care (CoCM) Initial Assessment    Session Time  Start: 8:30 AM  End: 9:15 AM     Collaborative Care program information (including case discussion with psychiatry, involvement of [facility] and billing when applicable) was provided and discussed with the patient. Patient Indicated understanding and agreed to proceed.   Confirm: Yes    TRACEE-7 Total Score: 11 (5/7/2025  8:36 AM)  PHQ-9 Total Score: 0      Reason for Visit / Chief Complaint  Chief Complaint   Patient presents with    Anxiety     Accompanied by: Self  Guardian Status: Self  Caregiver Status: Does not have a caregiver    Review of Symptoms    Sleep   Average Hours Sleep in/Night: Patient reported 5-6 hours of sleep a night wakes up two times a night.   Prepares Self for Sleep at Time: Patient reported going to bed around 10 PM  Usual Wake up Time: Patient reported waking up at 6:30 AM.   Sleep Symptoms: awakes 1-2 x night  Sleep Hygiene: TV in bedroom and uses electronics/phone    Mood   Symptom Onset/Duration: Last 1-2 months  Current Sx: None, denied  Triggers: NA  Past Sx: None, denied    Anxiety   Symptom Onset/Duration: Last 2-4 weeks Patient reported their anxiety started within the last month due to their job and being promoted at work and being short staffed.   Current Sx: feeling nervous/anxious/on edge, difficulty stopping/controlling worry, worrying too much, trouble relaxing, feeling fidgety/restless, and racing thoughts  Panic / Somatic Sx: none  Triggers: Patient reported work environment is stressful environment.   Past Sx: feeling nervous/anxious/on edge, difficulty stopping/controlling worry, worrying too much, trouble relaxing, and feeling fidgety/restless    Self-Esteem / Self-Image   Self Esteem Rating (1-10 Scale, 10 being high): 5  Self-Esteem / Self Image Sx: able to identify strength, good self-confidence, good perception of self, good self-actualization, feels attractive/desirable, feels has good qualities, and respects  self    Appetite   Description of Overall Appetite: denied any concerns  Eating Behaviors: eats balanced meals  Concerns with appetite: none, denied    Anger / Irritability  Symptoms of Anger / Irritability: internalized anger and suppresses anger     Communication / Self Expression  Communication Style & Concerns: assertive    Trauma    Patient reported no trauma.     Grief / Loss / Adjustment   Symptom Onset/Duration: more than 6 months  Current Sx: NA  Factors of Grief / Loss / Adjustment: loss of loved one(s)    Hallucinations / Delusions   Hallucinations & Delusions Experienced: none, denied    Learning Concerns / Memory   Learning Concerns & Sx: none, denied  Memory Concerns & Sx: none, denied    Functional impairment   Impacting ADL's: no impairment   Impacting IADL's: No impairment  Impacting Ability : No impairment    Associated Medical Concerns   Potential Associated Factors: None      Comprehensive Behavioral Health History     Medications  Current Mental Health Medications:   Cymbalta / duloxetine; Dose: 60 MG; Side effects: None, denied  hydroxyzine / Vistaril; Dose: 10 MG; Side effects: None, denied  Lexapro / escitalopram; Dose: 10 MG; Side effects: None, denied    Patient reported they feel like medications are working somewhat but would be interested in an increased dosage patient is experiencing headaches. Patient reported being on medications for a while and has been taking Cymbalta for five years or more and been taking Lexapro for a year.     Past Mental Health Medications:   NA    Concerns / challenges / barriers with taking medications? No concerns    Open to medication recommendations from consulting psychiatrist? Yes    Do you ever forget to take your medication? No  If yes, how often? NA    Mental Health Treatment History  Mental Health Treatment: couple/marriage counseling  Reason/When/Where/Outcome: Patient reported they did marriage counseling 10 years ago.     Risk History  Suicidal  Thoughts/Method/Intent/Plan: None, denied  Suicide Attempts/Preparations: None, denied  Number of Suicide Attempts: 0  Access to Firearms/Lethal Means: No guns in home  Non-Suicidal Self Injury: None, denied  Last Ralls Risk Score:    Protective Factors: strong protective factors    Violence: None, denied  Homicidal Thoughts/Method/Plan/Intent: None, denied  Homicidal Attempts/Preparations: None, denied  Number of Attempts: 0      Substance Use History    Substances    Social History     Substance and Sexual Activity   Alcohol Use Defer     Social History     Substance and Sexual Activity   Drug Use Never       Substance Current Use   No substance use.                     Addiction Treatment   Patient reported no AOD treatment.   Family History    Mental Health / Conditions    Family Member Condition / Diagnosis Medications / Side Effects   Sister Depression and anxiety None/Unknown   Father Depression None/Unknown               Substance Use    Family Member Substance Current Use   Father Alcohol No   Sister Alcohol and Opioids No                 History of Suicide    Family Member Details   N/A            Social History    Housing   Living Situation: lives with spouse and family and has pets  Safe Housing Conditions / Feels Safe in Home: Yes    Employment  Current Employment: employed  Current Concerns/Challenges: No    Income   Current Concerns/Challenges: No  Receive Benefits/Assistance: No    Education   Status / Level of Education: Associates degree    Legal   Legal Considerations: None, denied    Relationships   S/O:  Patient reported having a good relationship with .   Parents/Guardian: Patient reported having a good relationship with mother.  Siblings: Patient does not talk to siblings as mucha s they would like to.   Friends: Patient reported having a good relationship with their friends.   Other: Patient has a good relationship with kids.        Patient denies any .     Sexuality  / Gender   Concerns with Sexuality/Gender: None, denied  Sexual Orientation: heterosexual    Preferred Gender Pronouns / Identity: She/her/hers    Transportation   Transportation Concerns: active 's license and has vehicle    Yarsani/ Spirituality   Are you Mandaen or Spiritual: Yes  Yarsani / Practice: Evangelical  Spiritual Practice: seeks meaning/purpose in life, empathy and intuition focused, feeling at peace, sense of wholeness, sense of purposefulness, connected to inner/outer self, feeling content, practices gratitude, and acceptance of self    Coping / Strengths / Supports   Coping:  watching TV and Lego's  Strengths: adventurous, ambitious, brave, confident, creative, dependable, enthusiastic, flexible, forgiving, funny, good listener, honest, independent, intelligent, leadership, loving, open-minded, patient, persistent, and trustworthy  Supports: Spouse      Abuse History  Patient denies any abuse history.     Assessment Summary  / Plan    Assessment Summary:  What do you want to work on/get out of collaborative care?   Patient reported wanting to work on anxiety and coping with it, work on self confidence, work on better communication skills with home life and work life.     Plan:   Psych consult - ongoing, bi-weekly, Lgrffal-Tfkficfe-Orjopdrf interventions, and provide psycho-education    No follow-ups on file.    Provisional Findings / Impressions  Primary:   Patient is a 45 year old female who was referred by their PCP for anxiety. Patient completed the TRACEE-7 Total Score: 11  and the PHQ-9 Total Score: 0. Patient reported 5-6 hours of sleep a night wakes up two times a night. Patient reported their anxiety started within the last month due to their job and being promoted at work and being short staffed. Patient denies any trauma. Patient has experienced grief and loss within the last year when they lost their grandfather. Patient denies any SI or HI at the time of this assessment. Patients  Current Mental Health Medications: Cymbalta / duloxetine; Dose: 60 MG; Side effects: None, denied, hydroxyzine / Vistaril; Dose: 10 MG; Side effects: None, denied, and Lexapro / escitalopram; Dose: 10 MG; Side effects: None, denied. Patient reported they feel like medications are working somewhat but would be interested in an increased dosage patient is experiencing headaches. Patient reported being on medications for a while and has been taking Cymbalta for five years or more and been taking Lexapro for a year. Patient has attended marriage counseling in the past. Patient denies any AOD use. Patients father and sister have a history with AOD. Patients father and sister have a history of depression.     Secondary: Patient was alert and oriented and in a good mood.     Goals  This writer and patient will work on anxiety and coping with it, work on self confidence, work on better communication skills with home life and work life.   Care Plan    There is no care plan documentation to display.

## 2025-05-08 ENCOUNTER — DOCUMENTATION (OUTPATIENT)
Dept: BEHAVIORAL HEALTH | Facility: CLINIC | Age: 45
End: 2025-05-08
Payer: COMMERCIAL

## 2025-05-08 NOTE — PROGRESS NOTES
[facility] Psychiatry Consult Note     Louisa Henley is a 45 y.o. y.o., referred to Collaborative Care for symptoms of depression and anxiety. I have reviewed the patient with the behavioral health manager and reviewed the patient's electronic record.    TRACEE-7 Total Score: 11 (5/7/2025  8:36 AM)        This is a 45-year-old female who was referred to collaborative care by her PCP for anxiety.  The patient reports getting 5-6 hours of broken sleep nightly.  The patient reports anxiety started last month due to their job and being promoted at work and being short staffed. The patient denies depressive symptoms.  She endorses the following symptoms of anxiety: feeling nervous/anxious/on edge, difficulty stopping/controlling worry, worrying too much, trouble relaxing, feeling fidgety/restless, and racing thoughts.  The patient has no history of trauma.  The patient lost her grandfather within the past year and has been coping with grief related to this.  The patient denies any suicidal ideation at the time of assessment with behavioral health manager.  The patient is currently prescribed Cymbalta 60 mg daily, hydroxyzine 10 mg as needed, Lexapro 10 mg daily.  The patient feels her current medications are working somewhat but would be interested in an increased dosage.  The patient does experience headaches.  The patient has been on the Cymbalta for approximately 5 years which was prescribed for chronic lower back pain and Lexapro for approximately 1 year which was prescribed for migraines.  The patient has previously attended marriage counseling.  The patient denies any drug or alcohol use.        Recommendations:   -The patient is currently prescribed both an SNRI and an SSRI at starting doses.  This writer does not recommend the patient be on both an SNRI and an SSRI at the same time due to risk for serotonin syndrome.  This writer would also not recommend increasing either dose of the medications while she is on both of  them.  If the patient were willing to discontinue either the Cymbalta or the Lexapro, a dose adjustment of the one they continue on can be discussed  - Continue hydroxyzine 10 mg every 8 hours as needed for anxiety  - Continue to work with behavioral health manager to work on anxiety and coping with it, work on self confidence, work on better communication skills with home life and work life        The above treatment considerations and suggestions are based on consultations with the patient's care manager and a review of information available in the electronic medical record. I have not personally examined the patient. All recommendations should be implemented with consideration of the patient's relevant prior history and current clinical status. Please feel free to contact me with any questions about the care of this patient. I can be reached via the [facility] or Epic/[employer] messenger.

## 2025-05-23 ENCOUNTER — APPOINTMENT (OUTPATIENT)
Dept: PRIMARY CARE | Facility: CLINIC | Age: 45
End: 2025-05-23
Payer: COMMERCIAL

## 2025-05-30 ENCOUNTER — TELEPHONE (OUTPATIENT)
Dept: PRIMARY CARE | Facility: CLINIC | Age: 45
End: 2025-05-30

## 2025-05-30 ENCOUNTER — DOCUMENTATION (OUTPATIENT)
Dept: PRIMARY CARE | Facility: CLINIC | Age: 45
End: 2025-05-30

## 2025-05-30 ENCOUNTER — APPOINTMENT (OUTPATIENT)
Dept: PRIMARY CARE | Facility: CLINIC | Age: 45
End: 2025-05-30
Payer: COMMERCIAL

## 2025-05-30 DIAGNOSIS — F41.9 ANXIETY: Primary | ICD-10-CM

## 2025-05-30 NOTE — PROGRESS NOTES
This writer attempted to contact patient regarding appointment that was scheduled for 5/30/25 at 12:00 PM. Patient did not answer their phone. This writer left patient a voicemail. This writer will attempt to contact patient at a later date and time.

## 2025-06-05 DIAGNOSIS — G43.009 MIGRAINE WITHOUT AURA AND WITHOUT STATUS MIGRAINOSUS, NOT INTRACTABLE: ICD-10-CM

## 2025-06-06 ENCOUNTER — TELEPHONE (OUTPATIENT)
Dept: PRIMARY CARE | Facility: CLINIC | Age: 45
End: 2025-06-06
Payer: COMMERCIAL

## 2025-06-06 RX ORDER — ESCITALOPRAM OXALATE 10 MG/1
10 TABLET ORAL DAILY
Qty: 30 TABLET | Refills: 2 | Status: SHIPPED | OUTPATIENT
Start: 2025-06-06 | End: 2025-09-04

## 2025-06-06 NOTE — PROGRESS NOTES
This writer attempted to contact patient regarding follow up appointment. This writer left patient  a voicemail. This writer will attempt to contact patient at a later time and date.

## 2025-06-12 ENCOUNTER — TELEPHONE (OUTPATIENT)
Dept: PRIMARY CARE | Facility: CLINIC | Age: 45
End: 2025-06-12
Payer: COMMERCIAL

## 2025-06-12 NOTE — PROGRESS NOTES
This writer attempted to contact patient regarding collaborative care follow up appointment. This writer left patient a voicemail. This writer will attempt to contact patient at a later time and date.

## 2025-06-18 ENCOUNTER — TELEPHONE (OUTPATIENT)
Dept: PRIMARY CARE | Facility: CLINIC | Age: 45
End: 2025-06-18
Payer: COMMERCIAL

## 2025-06-18 NOTE — PROGRESS NOTES
This writer attempted to contact patient regarding collaborative care follow up. Patient did not answer their phone. This writer left patient a voicemail. If this writer does not hear from patient this writer will close patient out of collaborative care.

## 2025-06-25 ENCOUNTER — DOCUMENTATION (OUTPATIENT)
Dept: PRIMARY CARE | Facility: CLINIC | Age: 45
End: 2025-06-25
Payer: COMMERCIAL

## 2025-07-30 ENCOUNTER — TELEPHONE (OUTPATIENT)
Dept: PRIMARY CARE | Facility: CLINIC | Age: 45
End: 2025-07-30
Payer: COMMERCIAL

## 2025-08-01 ENCOUNTER — APPOINTMENT (OUTPATIENT)
Dept: PRIMARY CARE | Facility: CLINIC | Age: 45
End: 2025-08-01
Payer: COMMERCIAL

## 2025-08-07 ENCOUNTER — APPOINTMENT (OUTPATIENT)
Dept: PRIMARY CARE | Facility: CLINIC | Age: 45
End: 2025-08-07
Payer: COMMERCIAL

## 2025-08-15 ENCOUNTER — APPOINTMENT (OUTPATIENT)
Dept: PRIMARY CARE | Facility: CLINIC | Age: 45
End: 2025-08-15
Payer: COMMERCIAL

## 2025-08-19 DIAGNOSIS — H66.91 ACUTE INFECTION OF RIGHT EAR: Primary | ICD-10-CM

## 2025-08-19 RX ORDER — CIPROFLOXACIN AND DEXAMETHASONE 3; 1 MG/ML; MG/ML
4 SUSPENSION/ DROPS AURICULAR (OTIC) 2 TIMES DAILY
Qty: 7.5 ML | Refills: 1 | Status: SHIPPED | OUTPATIENT
Start: 2025-08-19

## 2025-08-26 ENCOUNTER — TELEPHONE (OUTPATIENT)
Dept: PRIMARY CARE | Facility: CLINIC | Age: 45
End: 2025-08-26
Payer: COMMERCIAL

## 2025-08-26 DIAGNOSIS — E78.5 HYPERLIPIDEMIA, UNSPECIFIED HYPERLIPIDEMIA TYPE: ICD-10-CM

## 2025-08-26 DIAGNOSIS — Z13.6 SCREENING FOR CARDIOVASCULAR CONDITION: ICD-10-CM

## 2025-08-26 DIAGNOSIS — E55.9 VITAMIN D DEFICIENCY: ICD-10-CM

## 2025-08-26 DIAGNOSIS — D64.9 ANEMIA, UNSPECIFIED TYPE: Primary | ICD-10-CM

## 2025-08-26 LAB
25(OH)D3+25(OH)D2 SERPL-MCNC: 19 NG/ML (ref 30–100)
ALBUMIN SERPL-MCNC: 3.8 G/DL (ref 3.6–5.1)
ALP SERPL-CCNC: 75 U/L (ref 31–125)
ALT SERPL-CCNC: 13 U/L (ref 6–29)
ANION GAP SERPL CALCULATED.4IONS-SCNC: 10 MMOL/L (CALC) (ref 7–17)
AST SERPL-CCNC: 18 U/L (ref 10–35)
BILIRUB SERPL-MCNC: 0.3 MG/DL (ref 0.2–1.2)
BUN SERPL-MCNC: 11 MG/DL (ref 7–25)
CALCIUM SERPL-MCNC: 9.1 MG/DL (ref 8.6–10.2)
CHLORIDE SERPL-SCNC: 107 MMOL/L (ref 98–110)
CHOLEST SERPL-MCNC: 259 MG/DL
CHOLEST/HDLC SERPL: 5 (CALC)
CO2 SERPL-SCNC: 22 MMOL/L (ref 20–32)
CREAT SERPL-MCNC: 0.9 MG/DL (ref 0.5–0.99)
EGFRCR SERPLBLD CKD-EPI 2021: 80 ML/MIN/1.73M2
ERYTHROCYTE [DISTWIDTH] IN BLOOD BY AUTOMATED COUNT: 15.7 % (ref 11–15)
GLUCOSE SERPL-MCNC: 95 MG/DL (ref 65–99)
HCT VFR BLD AUTO: 38 % (ref 35–45)
HDLC SERPL-MCNC: 52 MG/DL
HGB BLD-MCNC: 11.4 G/DL (ref 11.7–15.5)
LDLC SERPL CALC-MCNC: 178 MG/DL (CALC)
MCH RBC QN AUTO: 25.4 PG (ref 27–33)
MCHC RBC AUTO-ENTMCNC: 30 G/DL (ref 32–36)
MCV RBC AUTO: 84.6 FL (ref 80–100)
NONHDLC SERPL-MCNC: 207 MG/DL (CALC)
PLATELET # BLD AUTO: 474 THOUSAND/UL (ref 140–400)
PMV BLD REES-ECKER: 9.7 FL (ref 7.5–12.5)
POTASSIUM SERPL-SCNC: 4.4 MMOL/L (ref 3.5–5.3)
PROT SERPL-MCNC: 6.8 G/DL (ref 6.1–8.1)
RBC # BLD AUTO: 4.49 MILLION/UL (ref 3.8–5.1)
SODIUM SERPL-SCNC: 139 MMOL/L (ref 135–146)
TRIGL SERPL-MCNC: 143 MG/DL
TSH SERPL-ACNC: 1.05 MIU/L
VIT B12 SERPL-MCNC: 215 PG/ML (ref 200–1100)
WBC # BLD AUTO: 5.2 THOUSAND/UL (ref 3.8–10.8)

## 2025-08-27 RX ORDER — ATORVASTATIN CALCIUM 20 MG/1
20 TABLET, FILM COATED ORAL DAILY
Qty: 90 TABLET | Refills: 3 | Status: SHIPPED | OUTPATIENT
Start: 2025-08-27

## 2025-08-27 RX ORDER — ACETAMINOPHEN 500 MG
50 TABLET ORAL DAILY
Qty: 30 CAPSULE | Refills: 11 | Status: SHIPPED | OUTPATIENT
Start: 2025-08-27

## 2025-09-01 ENCOUNTER — OFFICE VISIT (OUTPATIENT)
Dept: URGENT CARE | Age: 45
End: 2025-09-01
Payer: COMMERCIAL

## 2025-09-01 VITALS
BODY MASS INDEX: 29.82 KG/M2 | RESPIRATION RATE: 18 BRPM | WEIGHT: 190 LBS | OXYGEN SATURATION: 98 % | HEART RATE: 82 BPM | SYSTOLIC BLOOD PRESSURE: 151 MMHG | TEMPERATURE: 98.5 F | HEIGHT: 67 IN | DIASTOLIC BLOOD PRESSURE: 95 MMHG

## 2025-09-01 DIAGNOSIS — G43.809 OTHER MIGRAINE WITHOUT STATUS MIGRAINOSUS, NOT INTRACTABLE: Primary | ICD-10-CM

## 2025-09-01 RX ORDER — KETOROLAC TROMETHAMINE 10 MG/1
10 TABLET, FILM COATED ORAL EVERY 6 HOURS PRN
Qty: 20 TABLET | Refills: 0 | Status: SHIPPED | OUTPATIENT
Start: 2025-09-01 | End: 2025-09-06

## 2025-09-01 RX ORDER — KETOROLAC TROMETHAMINE 30 MG/ML
30 INJECTION, SOLUTION INTRAMUSCULAR; INTRAVENOUS ONCE
Status: DISCONTINUED | OUTPATIENT
Start: 2025-09-01 | End: 2025-09-01

## 2025-09-01 RX ORDER — KETOROLAC TROMETHAMINE 30 MG/ML
30 INJECTION, SOLUTION INTRAMUSCULAR; INTRAVENOUS ONCE
Status: COMPLETED | OUTPATIENT
Start: 2025-09-01 | End: 2025-09-01

## 2025-09-01 RX ADMIN — KETOROLAC TROMETHAMINE 30 MG: 30 INJECTION, SOLUTION INTRAMUSCULAR; INTRAVENOUS at 08:31

## 2025-09-01 ASSESSMENT — PAIN SCALES - GENERAL: PAINLEVEL_OUTOF10: 8

## 2025-09-01 ASSESSMENT — ENCOUNTER SYMPTOMS: HEADACHES: 1
